# Patient Record
Sex: MALE | Race: WHITE | NOT HISPANIC OR LATINO | URBAN - METROPOLITAN AREA
[De-identification: names, ages, dates, MRNs, and addresses within clinical notes are randomized per-mention and may not be internally consistent; named-entity substitution may affect disease eponyms.]

---

## 2017-08-14 ENCOUNTER — EMERGENCY (EMERGENCY)
Facility: HOSPITAL | Age: 42
LOS: 0 days | Discharge: HOME | End: 2017-08-14
Attending: EMERGENCY MEDICINE
Payer: COMMERCIAL

## 2017-08-14 VITALS
HEART RATE: 76 BPM | RESPIRATION RATE: 16 BRPM | OXYGEN SATURATION: 97 % | TEMPERATURE: 98 F | DIASTOLIC BLOOD PRESSURE: 88 MMHG | SYSTOLIC BLOOD PRESSURE: 140 MMHG

## 2017-08-14 VITALS
DIASTOLIC BLOOD PRESSURE: 94 MMHG | TEMPERATURE: 98 F | SYSTOLIC BLOOD PRESSURE: 144 MMHG | RESPIRATION RATE: 16 BRPM | HEIGHT: 69 IN | OXYGEN SATURATION: 97 % | HEART RATE: 77 BPM | WEIGHT: 220.02 LBS

## 2017-08-14 DIAGNOSIS — M54.5 LOW BACK PAIN: ICD-10-CM

## 2017-08-14 DIAGNOSIS — S39.012A STRAIN OF MUSCLE, FASCIA AND TENDON OF LOWER BACK, INITIAL ENCOUNTER: ICD-10-CM

## 2017-08-14 DIAGNOSIS — J45.909 UNSPECIFIED ASTHMA, UNCOMPLICATED: ICD-10-CM

## 2017-08-14 DIAGNOSIS — K50.90 CROHN'S DISEASE, UNSPECIFIED, WITHOUT COMPLICATIONS: ICD-10-CM

## 2017-08-14 DIAGNOSIS — Z90.49 ACQUIRED ABSENCE OF OTHER SPECIFIED PARTS OF DIGESTIVE TRACT: Chronic | ICD-10-CM

## 2017-08-14 DIAGNOSIS — Z90.49 ACQUIRED ABSENCE OF OTHER SPECIFIED PARTS OF DIGESTIVE TRACT: ICD-10-CM

## 2017-08-14 PROCEDURE — 99283 EMERGENCY DEPT VISIT LOW MDM: CPT

## 2017-08-14 RX ORDER — DIAZEPAM 5 MG
1 TABLET ORAL
Qty: 9 | Refills: 0 | OUTPATIENT
Start: 2017-08-14 | End: 2017-08-17

## 2017-08-14 RX ORDER — TRAMADOL HYDROCHLORIDE 50 MG/1
1 TABLET ORAL
Qty: 12 | Refills: 0 | OUTPATIENT
Start: 2017-08-14 | End: 2017-08-17

## 2017-08-14 RX ORDER — TRAMADOL HYDROCHLORIDE 50 MG/1
50 TABLET ORAL ONCE
Qty: 0 | Refills: 0 | Status: DISCONTINUED | OUTPATIENT
Start: 2017-08-14 | End: 2017-08-14

## 2017-08-14 RX ORDER — ACETAMINOPHEN 500 MG
2 TABLET ORAL
Qty: 40 | Refills: 0 | OUTPATIENT
Start: 2017-08-14 | End: 2017-08-19

## 2017-08-14 RX ORDER — ACETAMINOPHEN 500 MG
975 TABLET ORAL ONCE
Qty: 0 | Refills: 0 | Status: COMPLETED | OUTPATIENT
Start: 2017-08-14 | End: 2017-08-14

## 2017-08-14 RX ORDER — DIAZEPAM 5 MG
5 TABLET ORAL ONCE
Qty: 0 | Refills: 0 | Status: DISCONTINUED | OUTPATIENT
Start: 2017-08-14 | End: 2017-08-14

## 2017-08-14 RX ADMIN — TRAMADOL HYDROCHLORIDE 50 MILLIGRAM(S): 50 TABLET ORAL at 09:49

## 2017-08-14 RX ADMIN — Medication 975 MILLIGRAM(S): at 09:49

## 2017-08-14 RX ADMIN — TRAMADOL HYDROCHLORIDE 50 MILLIGRAM(S): 50 TABLET ORAL at 09:50

## 2017-08-14 RX ADMIN — Medication 5 MILLIGRAM(S): at 09:49

## 2017-08-14 NOTE — ED PROVIDER NOTE - OBJECTIVE STATEMENT
41 year old male with PMH of asthma, Crohn's disease, chronic  back problems presenting to ED due to low back pain  starting 4 days ago, presenting to ED due to worsening, unsure of cause of back pain - but had it mild until Saturday when he lifted some heavy items into truck with father in law. Denies any weakness or focal numbness, symptoms worse with walking or getting up from sitting position.

## 2017-08-14 NOTE — ED PROVIDER NOTE - MEDICAL DECISION MAKING DETAILS
pt back pain improved given valium, tramadol and tylenol pt to follow up with Dr. Barnes as own PMD on vacation.

## 2018-04-05 ENCOUNTER — INPATIENT (INPATIENT)
Facility: HOSPITAL | Age: 43
LOS: 2 days | Discharge: HOME | End: 2018-04-08
Attending: INTERNAL MEDICINE | Admitting: INTERNAL MEDICINE
Payer: MEDICAID

## 2018-04-05 VITALS
SYSTOLIC BLOOD PRESSURE: 116 MMHG | OXYGEN SATURATION: 96 % | WEIGHT: 229.94 LBS | HEIGHT: 70 IN | DIASTOLIC BLOOD PRESSURE: 68 MMHG | RESPIRATION RATE: 20 BRPM | TEMPERATURE: 103 F | HEART RATE: 118 BPM

## 2018-04-05 DIAGNOSIS — Z90.49 ACQUIRED ABSENCE OF OTHER SPECIFIED PARTS OF DIGESTIVE TRACT: Chronic | ICD-10-CM

## 2018-04-05 LAB
ALBUMIN SERPL ELPH-MCNC: 3.3 G/DL — SIGNIFICANT CHANGE UP (ref 3.3–5)
ALP SERPL-CCNC: 99 U/L — SIGNIFICANT CHANGE UP (ref 40–120)
ALT FLD-CCNC: 46 U/L — SIGNIFICANT CHANGE UP (ref 12–78)
ANION GAP SERPL CALC-SCNC: 9 MMOL/L — SIGNIFICANT CHANGE UP (ref 5–17)
ANISOCYTOSIS BLD QL: SLIGHT — SIGNIFICANT CHANGE UP
APPEARANCE UR: CLEAR — SIGNIFICANT CHANGE UP
APTT BLD: 28.7 SEC — SIGNIFICANT CHANGE UP (ref 27.5–37.4)
AST SERPL-CCNC: 33 U/L — SIGNIFICANT CHANGE UP (ref 15–37)
BASOPHILS # BLD AUTO: 0 K/UL — SIGNIFICANT CHANGE UP (ref 0–0.2)
BASOPHILS NFR BLD AUTO: 0 % — SIGNIFICANT CHANGE UP (ref 0–2)
BILIRUB SERPL-MCNC: 0.6 MG/DL — SIGNIFICANT CHANGE UP (ref 0.2–1.2)
BILIRUB UR-MCNC: NEGATIVE — SIGNIFICANT CHANGE UP
BUN SERPL-MCNC: 13 MG/DL — SIGNIFICANT CHANGE UP (ref 7–23)
CALCIUM SERPL-MCNC: 8.2 MG/DL — LOW (ref 8.5–10.1)
CHLORIDE SERPL-SCNC: 105 MMOL/L — SIGNIFICANT CHANGE UP (ref 96–108)
CK MB BLD-MCNC: <0.9 % — SIGNIFICANT CHANGE UP (ref 0–3.5)
CK MB CFR SERPL CALC: <0.5 NG/ML — SIGNIFICANT CHANGE UP (ref 0.5–3.6)
CK SERPL-CCNC: 57 U/L — SIGNIFICANT CHANGE UP (ref 26–308)
CO2 SERPL-SCNC: 27 MMOL/L — SIGNIFICANT CHANGE UP (ref 22–31)
COLOR SPEC: YELLOW — SIGNIFICANT CHANGE UP
CREAT SERPL-MCNC: 1.03 MG/DL — SIGNIFICANT CHANGE UP (ref 0.5–1.3)
DIFF PNL FLD: NEGATIVE — SIGNIFICANT CHANGE UP
EOSINOPHIL # BLD AUTO: 0 K/UL — SIGNIFICANT CHANGE UP (ref 0–0.5)
EOSINOPHIL NFR BLD AUTO: 0 % — SIGNIFICANT CHANGE UP (ref 0–6)
FLUAV SPEC QL CULT: NEGATIVE — SIGNIFICANT CHANGE UP
FLUBV AG SPEC QL IA: NEGATIVE — SIGNIFICANT CHANGE UP
GLUCOSE BLDC GLUCOMTR-MCNC: 103 MG/DL — HIGH (ref 70–99)
GLUCOSE SERPL-MCNC: 99 MG/DL — SIGNIFICANT CHANGE UP (ref 70–99)
GLUCOSE UR QL: NEGATIVE MG/DL — SIGNIFICANT CHANGE UP
HCT VFR BLD CALC: 36.6 % — LOW (ref 39–50)
HGB BLD-MCNC: 12.9 G/DL — LOW (ref 13–17)
INR BLD: 1.13 RATIO — SIGNIFICANT CHANGE UP (ref 0.88–1.16)
KETONES UR-MCNC: NEGATIVE — SIGNIFICANT CHANGE UP
LACTATE SERPL-SCNC: 2.1 MMOL/L — HIGH (ref 0.7–2)
LEUKOCYTE ESTERASE UR-ACNC: NEGATIVE — SIGNIFICANT CHANGE UP
LYMPHOCYTES # BLD AUTO: 0.81 K/UL — LOW (ref 1–3.3)
LYMPHOCYTES # BLD AUTO: 9 % — LOW (ref 13–44)
MANUAL SMEAR VERIFICATION: SIGNIFICANT CHANGE UP
MCHC RBC-ENTMCNC: 28.5 PG — SIGNIFICANT CHANGE UP (ref 27–34)
MCHC RBC-ENTMCNC: 35.2 GM/DL — SIGNIFICANT CHANGE UP (ref 32–36)
MCV RBC AUTO: 81 FL — SIGNIFICANT CHANGE UP (ref 80–100)
MICROCYTES BLD QL: SLIGHT — SIGNIFICANT CHANGE UP
MONOCYTES # BLD AUTO: 0.09 K/UL — SIGNIFICANT CHANGE UP (ref 0–0.9)
MONOCYTES NFR BLD AUTO: 1 % — LOW (ref 2–14)
NEUTROPHILS # BLD AUTO: 8.07 K/UL — HIGH (ref 1.8–7.4)
NEUTROPHILS NFR BLD AUTO: 67 % — SIGNIFICANT CHANGE UP (ref 43–77)
NEUTS BAND # BLD: 23 % — HIGH (ref 0–8)
NITRITE UR-MCNC: NEGATIVE — SIGNIFICANT CHANGE UP
NRBC # BLD: 0 /100 — SIGNIFICANT CHANGE UP (ref 0–0)
NRBC # BLD: SIGNIFICANT CHANGE UP /100 WBCS (ref 0–0)
PH UR: 5 — SIGNIFICANT CHANGE UP (ref 5–8)
PLAT MORPH BLD: NORMAL — SIGNIFICANT CHANGE UP
PLATELET # BLD AUTO: 174 K/UL — SIGNIFICANT CHANGE UP (ref 150–400)
POTASSIUM SERPL-MCNC: 2.8 MMOL/L — CRITICAL LOW (ref 3.5–5.3)
POTASSIUM SERPL-SCNC: 2.8 MMOL/L — CRITICAL LOW (ref 3.5–5.3)
PROT SERPL-MCNC: 7 GM/DL — SIGNIFICANT CHANGE UP (ref 6–8.3)
PROT UR-MCNC: NEGATIVE MG/DL — SIGNIFICANT CHANGE UP
PROTHROM AB SERPL-ACNC: 12.4 SEC — SIGNIFICANT CHANGE UP (ref 9.8–12.7)
RBC # BLD: 4.52 M/UL — SIGNIFICANT CHANGE UP (ref 4.2–5.8)
RBC # FLD: 13.7 % — SIGNIFICANT CHANGE UP (ref 10.3–14.5)
RBC BLD AUTO: SIGNIFICANT CHANGE UP
SODIUM SERPL-SCNC: 141 MMOL/L — SIGNIFICANT CHANGE UP (ref 135–145)
SP GR SPEC: 1 — LOW (ref 1.01–1.02)
TROPONIN I SERPL-MCNC: <.015 NG/ML — SIGNIFICANT CHANGE UP (ref 0.01–0.04)
UROBILINOGEN FLD QL: NEGATIVE MG/DL — SIGNIFICANT CHANGE UP
WBC # BLD: 8.97 K/UL — SIGNIFICANT CHANGE UP (ref 3.8–10.5)
WBC # FLD AUTO: 8.97 K/UL — SIGNIFICANT CHANGE UP (ref 3.8–10.5)

## 2018-04-05 PROCEDURE — 71045 X-RAY EXAM CHEST 1 VIEW: CPT | Mod: 26

## 2018-04-05 PROCEDURE — 99285 EMERGENCY DEPT VISIT HI MDM: CPT

## 2018-04-05 PROCEDURE — 76700 US EXAM ABDOM COMPLETE: CPT | Mod: 26

## 2018-04-05 PROCEDURE — 74176 CT ABD & PELVIS W/O CONTRAST: CPT | Mod: 26

## 2018-04-05 PROCEDURE — 99223 1ST HOSP IP/OBS HIGH 75: CPT

## 2018-04-05 RX ORDER — CYCLOBENZAPRINE HYDROCHLORIDE 10 MG/1
5 TABLET, FILM COATED ORAL
Qty: 0 | Refills: 0 | COMMUNITY

## 2018-04-05 RX ORDER — SODIUM CHLORIDE 9 MG/ML
4000 INJECTION INTRAMUSCULAR; INTRAVENOUS; SUBCUTANEOUS ONCE
Qty: 0 | Refills: 0 | Status: COMPLETED | OUTPATIENT
Start: 2018-04-05 | End: 2018-04-05

## 2018-04-05 RX ORDER — VANCOMYCIN HCL 1 G
1000 VIAL (EA) INTRAVENOUS ONCE
Qty: 0 | Refills: 0 | Status: COMPLETED | OUTPATIENT
Start: 2018-04-05 | End: 2018-04-05

## 2018-04-05 RX ORDER — POTASSIUM CHLORIDE 20 MEQ
10 PACKET (EA) ORAL
Qty: 0 | Refills: 0 | Status: COMPLETED | OUTPATIENT
Start: 2018-04-05 | End: 2018-04-05

## 2018-04-05 RX ORDER — SODIUM CHLORIDE 9 MG/ML
3 INJECTION INTRAMUSCULAR; INTRAVENOUS; SUBCUTANEOUS ONCE
Qty: 0 | Refills: 0 | Status: COMPLETED | OUTPATIENT
Start: 2018-04-05 | End: 2018-04-05

## 2018-04-05 RX ORDER — ACETAMINOPHEN 500 MG
650 TABLET ORAL ONCE
Qty: 0 | Refills: 0 | Status: COMPLETED | OUTPATIENT
Start: 2018-04-05 | End: 2018-04-05

## 2018-04-05 RX ORDER — AZTREONAM 2 G
2000 VIAL (EA) INJECTION ONCE
Qty: 0 | Refills: 0 | Status: COMPLETED | OUTPATIENT
Start: 2018-04-05 | End: 2018-04-05

## 2018-04-05 RX ORDER — POTASSIUM CHLORIDE 20 MEQ
40 PACKET (EA) ORAL ONCE
Qty: 0 | Refills: 0 | Status: COMPLETED | OUTPATIENT
Start: 2018-04-05 | End: 2018-04-05

## 2018-04-05 RX ORDER — ONDANSETRON 8 MG/1
8 TABLET, FILM COATED ORAL ONCE
Qty: 0 | Refills: 0 | Status: COMPLETED | OUTPATIENT
Start: 2018-04-05 | End: 2018-04-05

## 2018-04-05 RX ADMIN — SODIUM CHLORIDE 3 MILLILITER(S): 9 INJECTION INTRAMUSCULAR; INTRAVENOUS; SUBCUTANEOUS at 18:02

## 2018-04-05 RX ADMIN — Medication 100 MILLIEQUIVALENT(S): at 22:15

## 2018-04-05 RX ADMIN — Medication 40 MILLIEQUIVALENT(S): at 21:03

## 2018-04-05 RX ADMIN — Medication 100 MILLIEQUIVALENT(S): at 19:49

## 2018-04-05 RX ADMIN — Medication 650 MILLIGRAM(S): at 18:07

## 2018-04-05 RX ADMIN — Medication 250 MILLIGRAM(S): at 19:49

## 2018-04-05 RX ADMIN — SODIUM CHLORIDE 4000 MILLILITER(S): 9 INJECTION INTRAMUSCULAR; INTRAVENOUS; SUBCUTANEOUS at 18:03

## 2018-04-05 RX ADMIN — Medication 650 MILLIGRAM(S): at 18:44

## 2018-04-05 RX ADMIN — ONDANSETRON 8 MILLIGRAM(S): 8 TABLET, FILM COATED ORAL at 18:28

## 2018-04-05 RX ADMIN — Medication 50 MILLIGRAM(S): at 18:11

## 2018-04-05 RX ADMIN — Medication 100 MILLIEQUIVALENT(S): at 21:03

## 2018-04-05 NOTE — ED PROVIDER NOTE - PHYSICAL EXAMINATION
Gen: Alert, Well appearing. NAD    Head: NC, AT, PERRL, EOMI, normal lids/conjunctiva   ENT: Bilateral TM WNL, normal hearing, patent oropharynx without erythema/exudate, uvula midline. + DRY MM.   Neck: supple, no tenderness/meningismus/JVD   Pulm: Bilateral clear BS, normal resp effort, no wheeze/stridor/retractions  CV: RRR, no M/R/G, +dist pulses   Abd: soft, ++ tender rt abd,  +BS, no guarding/rebound tenderness  Mskel: no edema/erythema/cyanosis   Skin: no rash   Neuro: AAOx3, no sensory/motor deficits, CN 2-12 intact

## 2018-04-05 NOTE — H&P ADULT - NSHPLABSRESULTS_GEN_ALL_CORE
Vital Signs Last 24 Hrs  T(C): 39.5 (2018 17:40), Max: 39.5 (2018 17:40)  T(F): 103.1 (2018 17:40), Max: 103.1 (2018 17:40)  HR: 74 (2018 23:45) (74 - 118)  BP: 101/60 (2018 23:45) (101/60 - 116/68)  BP(mean): --  RR: 20 (2018 17:40) (20 - 20)  SpO2: 96% (2018 17:40) (96% - 96%)        LABS:                        12.9   8.97  )-----------( 174      ( 2018 18:13 )             36.6     04-05    141  |  105  |  13  ----------------------------<  99  2.8<LL>   |  27  |  1.03    Ca    8.2<L>      2018 18:13    TPro  7.0  /  Alb  3.3  /  TBili  0.6  /  DBili  x   /  AST  33  /  ALT  46  /  AlkPhos  99  04-05    PT/INR - ( 2018 18:13 )   PT: 12.4 sec;   INR: 1.13 ratio         PTT - ( 2018 18:13 )  PTT:28.7 sec  Urinalysis Basic - ( 2018 19:34 )    Color: Yellow / Appearance: Clear / S.005 / pH: x  Gluc: x / Ketone: Negative  / Bili: Negative / Urobili: Negative mg/dL   Blood: x / Protein: Negative mg/dL / Nitrite: Negative   Leuk Esterase: Negative / RBC: x / WBC x   Sq Epi: x / Non Sq Epi: x / Bacteria: x        RADIOLOGY & ADDITIONAL STUDIES:    CT ab/plv:  IMPRESSION:  No evidence for colon perforation.  Hepatic steatosis.  1.8 cm gallstone in neck of gallbladder.    US ab:  IMPRESSION:   Fatty liver and hepatosplenomegaly. Cholelithiasis without cholecystitis.

## 2018-04-05 NOTE — ED PROVIDER NOTE - OBJECTIVE STATEMENT
43yo male with pmh asthma, crohns, psh: ileal resection presents with weakness, fever, nausea and near syncope. PT was post routine colonoscopy today. Pt states was in USOH, NPO, and took prep. Pt without issue post colonoscopy however as day progressed, weak, nausea and near syncopal. PT febrile in ER. Pt with mild rt sided abd pain.  gi: [phone], josep ballard, [facility]    ROS: + fever/chills. No photophobia/eye pain/changes in vision, No ear pain/sore throat/dysphagia, No chest pain/palpitations. No SOB/cough/stridor. +abdominal pain, +N, no V/D, no black/bloody bm. No dysuria/frequency/discharge, No headache. + Dizziness.  No rash.  No numbness/tingling/weakness. 41yo male with pmh asthma, crohns, psh: ileal resection presents with weakness, fever, nausea and near syncope. PT was post routine colonoscopy today. Pt states was in USOH, NPO, and took prep. Pt without issue post colonoscopy however as day progressed, weak, nausea and near syncopal. PT febrile in ER. Pt with mild rt sided abd pain.  gi: [phone], josep ballard, [facility] cell: [phone]    ROS: + fever/chills. No photophobia/eye pain/changes in vision, No ear pain/sore throat/dysphagia, No chest pain/palpitations. No SOB/cough/stridor. +abdominal pain, +N, no V/D, no black/bloody bm. No dysuria/frequency/discharge, No headache. + Dizziness.  No rash.  No numbness/tingling/weakness.

## 2018-04-05 NOTE — ED ADULT TRIAGE NOTE - CHIEF COMPLAINT QUOTE
colonoscopy today , came back very weak , as  per father law near syncope outside . f/s -103mg/dl in the ed

## 2018-04-05 NOTE — H&P ADULT - HISTORY OF PRESENT ILLNESS
42 year old man with PMH Asthma (intubated as a child for asthma attack), Crohn's on Stelara, and PSH Ileocecal resection 2/2 Crohn's disease in 2013 at [facility] presents with complaint of fever, chills, mild abdominal pain, and nausea since yesterday.  He states he went for his yearly Colonoscopy yesterday and began to feel sick on the way home.  He denies diarrhea, bloody stool.  He says his abdominal pain has mostly subsided and still feels fever with chills.  Seen by surgery in ED, consult appreciated.    CT ab/plv and US show Cholelithiasis without Cholecystitis.  23% bandemia on labs, lactate 2.1, potassium 2.8.  Given aztreonam and vanco in ED. 42 year old man with PMH Asthma (intubated as a child for asthma attack), Crohn's on Stelara, and PSH Ileocecal resection 2/2 Crohn's disease in 2013 at [facility] presents with complaint of fever, chills, mild abdominal pain, and nausea since earlier today.  He states he went for his yearly Colonoscopy today and began to feel sick on the way home.  He denies diarrhea, bloody stool.  He says his abdominal pain has mostly subsided and still feels fever with chills.  Seen by surgery in ED, consult appreciated.  Of note, he states he had a similar problem last year after ingesting contrast (gadolinium?) for MRI; did not have contrast yesterday, only Colonoscopy prep.    CT ab/plv and US show Cholelithiasis without Cholecystitis.  Pt febrile to 103.1. 23% bandemia on labs, lactate 2.1, potassium 2.8.  Given aztreonam and vanco in ED.

## 2018-04-05 NOTE — CONSULT NOTE ADULT - SUBJECTIVE AND OBJECTIVE BOX
GENERAL SURGERY CONSULT NOTE    Patient is a 42y old  Male who presents with a chief complaint of Fever/chills, weakness.    HPI:  42M with PMH Asthma (intubated as a child for asthma attack), Crohn's, and PSH Ileocecal resection 2/2 Crohn's disease in  at [facility] presents to the ED c/o fever/chills and weakness s/p routine colonoscopy today. Patient was told today that his colonoscopy was normal. When patient got home he developed fever and chills, and began vomiting. Admits to having some right sided periumbilical cramping pain which lasted for a few minutes in the ED but resolved spontaneously. Patient last ate  before he used the prep for colonoscopy. Patient is on immunosuppressive therapy for Crohn's (injection q8wks), and gets yearly colonoscopy. Denies similar incident s/p colonoscopy in the past. Denies hx of biliary colic or known gallbladder disease.   Denies chest pain, palpitations, sob, cough, dizziness, dysuria.       PAST MEDICAL & SURGICAL HISTORY:  Back spasm  Asthma  Crohns disease  History of colon resection      Review of Systems:  I have reviewed 9 systems with the patient and the only positive findings were Fever/chills, N/V, weakness.   Contained within HPI.    Allergies    ibuprofen (Unknown)  iodine (Anaphylaxis)  penicillin (Unknown)  tetracycline (Unknown)    SOCIAL HISTORY   Denies tobacco, alcohol or recreational drug use.   Adopted.    FAMILY HISTORY:  Adopted, unknown family hx.     Vital Signs Last 24 Hrs  T(C): 39.5 (2018 17:40), Max: 39.5 (2018 17:40)  T(F): 103.1 (2018 17:40), Max: 103.1 (2018 17:40)  HR: 118 (2018 17:40) (118 - 118)  BP: 116/68 (2018 17:40) (116/68 - 116/68)  RR: 20 (2018 17:40) (20 - 20)  SpO2: 96% (2018 17:40) (96% - 96%)    Physical Exam:    General:  Appears stated age, well-groomed, well-nourished, no distress  Eyes : DAVID  HENT:  WNL, no JVD  Chest:  clear breath sounds b/l, no wheezing/rhonchi/rales.  Cardiovascular:  S1S2, RRR  Abdomen:  Healed midline surgical scar. +BS, Nondistended, soft, Nontender, no guarding, no rebound.   Extremities:  2+ peripheral pulses b/l. Calf soft, nontender b/l.   Skin: Intact. No rash  Musculoskeletal:  normal strength  Neuro/Psych: A&Ox3. No focal deficits.       LABS:                        12.9   8.97  )-----------( 174      ( 2018 18:13 )             36.6     04-    141  |  105  |  13  ----------------------------<  99  2.8<LL>   |  27  |  1.03    Ca    8.2<L>      2018 18:13    TPro  7.0  /  Alb  3.3  /  TBili  0.6  /  DBili  x   /  AST  33  /  ALT  46  /  AlkPhos  99  04-05    PT/INR - ( 2018 18:13 )   PT: 12.4 sec;   INR: 1.13 ratio         PTT - ( 2018 18:13 )  PTT:28.7 sec  Urinalysis Basic - ( 2018 19:34 )    Color: Yellow / Appearance: Clear / S.005 / pH: x  Gluc: x / Ketone: Negative  / Bili: Negative / Urobili: Negative mg/dL   Blood: x / Protein: Negative mg/dL / Nitrite: Negative   Leuk Esterase: Negative / RBC: x / WBC x   Sq Epi: x / Non Sq Epi: x / Bacteria: x    RADIOLOGY & ADDITIONAL STUDIES:  < from: CT Abdomen and Pelvis No Cont (18 @ 20:18) >  EXAM:  CT ABDOMEN AND PELVIS                            PROCEDURE DATE:  2018          INTERPRETATION:  CLINICAL INFORMATION: Pain and fever following   colonoscopy.    CT of the abdomen and pelvis was performed without intravenous contrast.   Sagittal and coronal reconstructions were obtained.     Comparison: None.    Lower chest: The visualized lower lungs are unremarkable.    Abdomen:   Liver: Hepatic steatosis.  Spleen: normal.  Pancreas: normal.  Adrenal glands: normal.  Gall bladder: 1.8 cm gallstone in neck of gallbladder. The gallbladder is   otherwise unremarkable..  Kidneys: normal.    Lymph nodes: There is no retroperitoneal or abdominal lymphadenopathy.  Aorta and branch vessels: Atherosclerotic changes.  Inferior vena cava: Unremarkable.    Bowel: Anastomotic suture line at the ileocecal interface which is   unremarkable. The colon is otherwise normal. Small bowel is unremarkable.   There is no obstruction.    There is no free air of free fluid.    Pelvis:  Bladder: Normal.  Reproductive organs: Normal.  Lymphnodes: No pathologically enlarged lymphnodes.   Small right fat-containing inguinal hernia    Osseous structures:  The osseous structures show scattered degenerative changes.    IMPRESSION:    No evidence forcolon perforation.    Hepatic steatosis.    1.8 cm gallstone in neck of gallbladder.    < end of copied text >    < from: Xray Chest 1 View AP/PA. (18 @ 18:32) >    EXAM:  XR CHEST AP OR PA 1V                            PROCEDURE DATE:  2018          INTERPRETATION:  CLINICAL INFORMATION: Sepsis.    A single portable view of the chest was obtained.     Comparison: 2011.     The mediastinal cardiac silhouette is unremarkable.    The lungs are clear.     No acute osseous finding.     IMPRESSION:    No acute process.    < end of copied text >

## 2018-04-05 NOTE — ED ADULT NURSE NOTE - OBJECTIVE STATEMENT
patient c/o of abdominal pain with N/V and fever started 2 H ago , patient had an endoscopy and colonoscopy today , patient denied any bleeding , no visible bleeding at this time , patient denied chest pain no difficulty breathing

## 2018-04-05 NOTE — H&P ADULT - ASSESSMENT
42 year old man with PMH Asthma (intubated as a child for asthma attack), Crohn's on Stelara, and PSH Ileocecal resection 2/2 Crohn's disease in 2013 at [facility] presents with complaint of fever, chills, mild abdominal pain, and nausea since earlier today.  Pt meets Sepsis criteria s/p Colonoscopy today and will require admission for at least 2 midnights for further management as detailed below:    IMPROVE VTE Individual Risk Assessment          RISK                                                          Points    [  ] Previous VTE                                                3    [  ] Thrombophilia                                             2    [  ] Lower limb paralysis                                    2        (unable to hold up >15 seconds)      [  ] Current Cancer                                             2         (within 6 months)    [  ] Immobilization > 24 hrs                              1    [  ] ICU/CCU stay > 24 hours                            1    [  ] Age > 60                                                    1    IMPROVE VTE Score ______0___

## 2018-04-05 NOTE — ED PROVIDER NOTE - MEDICAL DECISION MAKING DETAILS
Pending CT, UA and admission. Patient signed out to incoming physician.  All decisions regarding the progression of care will be made at their discretion.

## 2018-04-05 NOTE — H&P ADULT - NSHPPHYSICALEXAM_GEN_ALL_CORE
GENERAL: NAD, well-groomed, well-developed  HEAD:  Atraumatic, Normocephalic  EYES: EOMI, PERRLA, conjunctiva and sclera clear  ENMT: No tonsillar erythema, exudates, or enlargement; Moist mucous membranes, No lesions  NECK: Supple, No JVD, Normal thyroid  NERVOUS SYSTEM:  Alert & Oriented X3, Good concentration DTRs 2+ intact and symmetric  CHEST/LUNG: Clear to ascultation bilaterally; No rales, rhonchi, wheezing, or rubs  HEART: Regular rate and rhythm; No murmurs, rubs, or gallops  ABDOMEN: Soft, mild diffuse tenderness without rebound or guarding, Nondistended; Bowel sounds present  EXTREMITIES: No clubbing, cyanosis, or edema  SKIN: no rashes or lesions   PSYCH: normal affect and behavior

## 2018-04-05 NOTE — H&P ADULT - NSHPREVIEWOFSYSTEMS_GEN_ALL_CORE
No photophobia/eye pain/changes in vision,  No chest pain/palpitations, no SOB/cough/wheeze/stridor, no dysuria/frequency/discharge, No neck/back pain, no rash, no changes in neurological status/function.

## 2018-04-05 NOTE — H&P ADULT - PROBLEM SELECTOR PLAN 1
IVF NS @ 100 ml/hr  Zofran IVPB Q8hrly PRN for nausea/vomiting  Stool work up: Stool Cx, C.diff, Ova parasite and Blood culture x 2 sets  Given Aztreonam and Vanco in ED, would continue as pt s/p instrumentation, de-escalate as appropriate  Tylenol 650mg PO Q8hrly PRN for fever & pain.  GI consult, surgery consult appreciated  Advance diet as tolerated

## 2018-04-05 NOTE — CONSULT NOTE ADULT - ASSESSMENT
Assessment: 42M with PMH Asthma, Crohn's (on immunosuppressive therapy), PSH ileocecal resection presents with Fever/chills, weakness s/p routine colonoscopy today, found with Gallstone on CT scan admitted with sepsis, r/o acute cholecystitis.    Plan:  -Admit to medicine  -F/u Abdominal US to further evaluate GB  -Can likely advance diet as tolerated pending results of abdominal US as patient does not complain of abdominal pain and exam is benign.   -ABX per medicine, IV hydration, antiemetic prn  -DVT prophylaxis, OOB to ambulate  -f/u repeat lactate  -f/u am labs, K repleted by ED, f/u K  -Discussed with Dr. Schuler Assessment: 42M with PMH Asthma, Crohn's (on immunosuppressive therapy), PSH ileocecal resection presents with Fever/chills, weakness s/p routine colonoscopy today, found with Gallstone on CT scan. Admitted with sepsis, r/o acute cholecystitis.    Plan:  -Admit to medicine  -F/u Abdominal US to further evaluate GB  -Can likely advance diet as tolerated pending results of abdominal US as patient does not complain of abdominal pain and exam is benign.   -ABX per medicine, IV hydration, antiemetic prn, antipyretic prn   -DVT prophylaxis, OOB to ambulate  -f/u repeat lactate, f/u blood cultures  -f/u am labs, K repleted by ED, f/u K  -Discussed with Dr. Schuler

## 2018-04-06 DIAGNOSIS — E87.6 HYPOKALEMIA: ICD-10-CM

## 2018-04-06 DIAGNOSIS — Z29.9 ENCOUNTER FOR PROPHYLACTIC MEASURES, UNSPECIFIED: ICD-10-CM

## 2018-04-06 DIAGNOSIS — K50.919 CROHN'S DISEASE, UNSPECIFIED, WITH UNSPECIFIED COMPLICATIONS: ICD-10-CM

## 2018-04-06 DIAGNOSIS — A41.9 SEPSIS, UNSPECIFIED ORGANISM: ICD-10-CM

## 2018-04-06 DIAGNOSIS — K80.20 CALCULUS OF GALLBLADDER WITHOUT CHOLECYSTITIS WITHOUT OBSTRUCTION: ICD-10-CM

## 2018-04-06 DIAGNOSIS — J45.30 MILD PERSISTENT ASTHMA, UNCOMPLICATED: ICD-10-CM

## 2018-04-06 LAB
ALBUMIN SERPL ELPH-MCNC: 2.8 G/DL — LOW (ref 3.3–5)
ALP SERPL-CCNC: 83 U/L — SIGNIFICANT CHANGE UP (ref 40–120)
ALT FLD-CCNC: 48 U/L — SIGNIFICANT CHANGE UP (ref 12–78)
ANION GAP SERPL CALC-SCNC: 9 MMOL/L — SIGNIFICANT CHANGE UP (ref 5–17)
AST SERPL-CCNC: 35 U/L — SIGNIFICANT CHANGE UP (ref 15–37)
BILIRUB SERPL-MCNC: 1.4 MG/DL — HIGH (ref 0.2–1.2)
BUN SERPL-MCNC: 10 MG/DL — SIGNIFICANT CHANGE UP (ref 7–23)
C DIFF BY PCR RESULT: SIGNIFICANT CHANGE UP
C DIFF TOX GENS STL QL NAA+PROBE: SIGNIFICANT CHANGE UP
CALCIUM SERPL-MCNC: 7.2 MG/DL — LOW (ref 8.5–10.1)
CHLORIDE SERPL-SCNC: 109 MMOL/L — HIGH (ref 96–108)
CO2 SERPL-SCNC: 22 MMOL/L — SIGNIFICANT CHANGE UP (ref 22–31)
CREAT SERPL-MCNC: 0.98 MG/DL — SIGNIFICANT CHANGE UP (ref 0.5–1.3)
CRP SERPL-MCNC: 1 MG/DL — HIGH (ref 0–0.4)
CULTURE RESULTS: NO GROWTH — SIGNIFICANT CHANGE UP
GLUCOSE SERPL-MCNC: 105 MG/DL — HIGH (ref 70–99)
HCT VFR BLD CALC: 34.3 % — LOW (ref 39–50)
HGB BLD-MCNC: 11.7 G/DL — LOW (ref 13–17)
LACTATE SERPL-SCNC: 1.3 MMOL/L — SIGNIFICANT CHANGE UP (ref 0.7–2)
LACTATE SERPL-SCNC: 2.1 MMOL/L — HIGH (ref 0.7–2)
MAGNESIUM SERPL-MCNC: 1.3 MG/DL — LOW (ref 1.6–2.6)
MCHC RBC-ENTMCNC: 28.4 PG — SIGNIFICANT CHANGE UP (ref 27–34)
MCHC RBC-ENTMCNC: 34.1 GM/DL — SIGNIFICANT CHANGE UP (ref 32–36)
MCV RBC AUTO: 83.3 FL — SIGNIFICANT CHANGE UP (ref 80–100)
NRBC # BLD: 0 /100 WBCS — SIGNIFICANT CHANGE UP (ref 0–0)
PHOSPHATE SERPL-MCNC: 2.2 MG/DL — LOW (ref 2.5–4.5)
PLATELET # BLD AUTO: 127 K/UL — LOW (ref 150–400)
POTASSIUM SERPL-MCNC: 3.1 MMOL/L — LOW (ref 3.5–5.3)
POTASSIUM SERPL-SCNC: 3.1 MMOL/L — LOW (ref 3.5–5.3)
PROCALCITONIN SERPL-MCNC: 32.06 NG/ML — HIGH (ref 0–0.04)
PROT SERPL-MCNC: 6.4 GM/DL — SIGNIFICANT CHANGE UP (ref 6–8.3)
RBC # BLD: 4.12 M/UL — LOW (ref 4.2–5.8)
RBC # FLD: 14.1 % — SIGNIFICANT CHANGE UP (ref 10.3–14.5)
SODIUM SERPL-SCNC: 140 MMOL/L — SIGNIFICANT CHANGE UP (ref 135–145)
SPECIMEN SOURCE: SIGNIFICANT CHANGE UP
WBC # BLD: 8.8 K/UL — SIGNIFICANT CHANGE UP (ref 3.8–10.5)
WBC # FLD AUTO: 8.8 K/UL — SIGNIFICANT CHANGE UP (ref 3.8–10.5)

## 2018-04-06 PROCEDURE — 74018 RADEX ABDOMEN 1 VIEW: CPT | Mod: 26

## 2018-04-06 PROCEDURE — 99233 SBSQ HOSP IP/OBS HIGH 50: CPT

## 2018-04-06 PROCEDURE — 93010 ELECTROCARDIOGRAM REPORT: CPT

## 2018-04-06 RX ORDER — POTASSIUM PHOSPHATE, MONOBASIC POTASSIUM PHOSPHATE, DIBASIC 236; 224 MG/ML; MG/ML
15 INJECTION, SOLUTION INTRAVENOUS EVERY 6 HOURS
Qty: 0 | Refills: 0 | Status: COMPLETED | OUTPATIENT
Start: 2018-04-06 | End: 2018-04-06

## 2018-04-06 RX ORDER — ACETAMINOPHEN 500 MG
650 TABLET ORAL EVERY 6 HOURS
Qty: 0 | Refills: 0 | Status: DISCONTINUED | OUTPATIENT
Start: 2018-04-06 | End: 2018-04-08

## 2018-04-06 RX ORDER — ALBUTEROL 90 UG/1
2 AEROSOL, METERED ORAL EVERY 6 HOURS
Qty: 0 | Refills: 0 | Status: DISCONTINUED | OUTPATIENT
Start: 2018-04-06 | End: 2018-04-08

## 2018-04-06 RX ORDER — POTASSIUM CHLORIDE 20 MEQ
40 PACKET (EA) ORAL ONCE
Qty: 0 | Refills: 0 | Status: COMPLETED | OUTPATIENT
Start: 2018-04-06 | End: 2018-04-06

## 2018-04-06 RX ORDER — SODIUM CHLORIDE 9 MG/ML
1000 INJECTION INTRAMUSCULAR; INTRAVENOUS; SUBCUTANEOUS
Qty: 0 | Refills: 0 | Status: DISCONTINUED | OUTPATIENT
Start: 2018-04-06 | End: 2018-04-08

## 2018-04-06 RX ORDER — SODIUM,POTASSIUM PHOSPHATES 278-250MG
1 POWDER IN PACKET (EA) ORAL
Qty: 0 | Refills: 0 | Status: DISCONTINUED | OUTPATIENT
Start: 2018-04-06 | End: 2018-04-06

## 2018-04-06 RX ORDER — BUDESONIDE AND FORMOTEROL FUMARATE DIHYDRATE 160; 4.5 UG/1; UG/1
2 AEROSOL RESPIRATORY (INHALATION)
Qty: 0 | Refills: 0 | Status: DISCONTINUED | OUTPATIENT
Start: 2018-04-06 | End: 2018-04-08

## 2018-04-06 RX ORDER — MAGNESIUM SULFATE 500 MG/ML
1 VIAL (ML) INJECTION
Qty: 0 | Refills: 0 | Status: COMPLETED | OUTPATIENT
Start: 2018-04-06 | End: 2018-04-06

## 2018-04-06 RX ORDER — ALPRAZOLAM 0.25 MG
0.5 TABLET ORAL ONCE
Qty: 0 | Refills: 0 | Status: DISCONTINUED | OUTPATIENT
Start: 2018-04-06 | End: 2018-04-06

## 2018-04-06 RX ORDER — AZTREONAM 2 G
1000 VIAL (EA) INJECTION EVERY 12 HOURS
Qty: 0 | Refills: 0 | Status: DISCONTINUED | OUTPATIENT
Start: 2018-04-06 | End: 2018-04-08

## 2018-04-06 RX ORDER — VANCOMYCIN HCL 1 G
1000 VIAL (EA) INTRAVENOUS EVERY 8 HOURS
Qty: 0 | Refills: 0 | Status: DISCONTINUED | OUTPATIENT
Start: 2018-04-06 | End: 2018-04-08

## 2018-04-06 RX ORDER — ONDANSETRON 8 MG/1
8 TABLET, FILM COATED ORAL EVERY 8 HOURS
Qty: 0 | Refills: 0 | Status: DISCONTINUED | OUTPATIENT
Start: 2018-04-06 | End: 2018-04-08

## 2018-04-06 RX ADMIN — Medication 650 MILLIGRAM(S): at 14:54

## 2018-04-06 RX ADMIN — Medication 0.5 MILLIGRAM(S): at 19:55

## 2018-04-06 RX ADMIN — Medication 1 TABLET(S): at 13:03

## 2018-04-06 RX ADMIN — ONDANSETRON 8 MILLIGRAM(S): 8 TABLET, FILM COATED ORAL at 14:53

## 2018-04-06 RX ADMIN — Medication 250 MILLIGRAM(S): at 05:46

## 2018-04-06 RX ADMIN — Medication 100 GRAM(S): at 11:37

## 2018-04-06 RX ADMIN — Medication 650 MILLIGRAM(S): at 19:55

## 2018-04-06 RX ADMIN — SODIUM CHLORIDE 100 MILLILITER(S): 9 INJECTION INTRAMUSCULAR; INTRAVENOUS; SUBCUTANEOUS at 02:56

## 2018-04-06 RX ADMIN — Medication 250 MILLIGRAM(S): at 22:14

## 2018-04-06 RX ADMIN — Medication 50 MILLIGRAM(S): at 05:45

## 2018-04-06 RX ADMIN — SODIUM CHLORIDE 100 MILLILITER(S): 9 INJECTION INTRAMUSCULAR; INTRAVENOUS; SUBCUTANEOUS at 13:50

## 2018-04-06 RX ADMIN — POTASSIUM PHOSPHATE, MONOBASIC POTASSIUM PHOSPHATE, DIBASIC 63.75 MILLIMOLE(S): 236; 224 INJECTION, SOLUTION INTRAVENOUS at 23:24

## 2018-04-06 RX ADMIN — Medication 650 MILLIGRAM(S): at 08:06

## 2018-04-06 RX ADMIN — ONDANSETRON 8 MILLIGRAM(S): 8 TABLET, FILM COATED ORAL at 02:41

## 2018-04-06 RX ADMIN — Medication 50 MILLIGRAM(S): at 17:51

## 2018-04-06 RX ADMIN — POTASSIUM PHOSPHATE, MONOBASIC POTASSIUM PHOSPHATE, DIBASIC 63.75 MILLIMOLE(S): 236; 224 INJECTION, SOLUTION INTRAVENOUS at 17:46

## 2018-04-06 RX ADMIN — BUDESONIDE AND FORMOTEROL FUMARATE DIHYDRATE 2 PUFF(S): 160; 4.5 AEROSOL RESPIRATORY (INHALATION) at 05:45

## 2018-04-06 RX ADMIN — BUDESONIDE AND FORMOTEROL FUMARATE DIHYDRATE 2 PUFF(S): 160; 4.5 AEROSOL RESPIRATORY (INHALATION) at 18:45

## 2018-04-06 RX ADMIN — Medication 100 GRAM(S): at 10:29

## 2018-04-06 RX ADMIN — Medication 650 MILLIGRAM(S): at 02:55

## 2018-04-06 RX ADMIN — Medication 250 MILLIGRAM(S): at 13:50

## 2018-04-06 RX ADMIN — Medication 40 MILLIEQUIVALENT(S): at 10:25

## 2018-04-06 NOTE — PROGRESS NOTE ADULT - SUBJECTIVE AND OBJECTIVE BOX
Patient is a 42y old  Male who presents with a chief complaint of Sepsis after Colonoscopy (2018 23:59)  had colonoscopy yesterday in [place]. after reaching home developed fever of 103. . patient has hx of resection of ileum for Croh's disease , asthma.   Pt evaluated by Gi Dr Isabel.  INTERVAL HPI/OVERNIGHT EVENTS: uneventful. denies any acute abd pain. . no fever now.    MEDICATIONS  (STANDING):  aztreonam  IVPB 1000 milliGRAM(s) IV Intermittent every 12 hours  buDESOnide 160 MICROgram(s)/formoterol 4.5 MICROgram(s) Inhaler 2 Puff(s) Inhalation two times a day  magnesium sulfate  IVPB 1 Gram(s) IV Intermittent every 1 hour  potassium acid phosphate/sodium acid phosphate tablet (K-PHOS No. 2) 1 Tablet(s) Oral four times a day with meals  sodium chloride 0.9%. 1000 milliLiter(s) (100 mL/Hr) IV Continuous <Continuous>  vancomycin  IVPB 1000 milliGRAM(s) IV Intermittent every 8 hours    MEDICATIONS  (PRN):  acetaminophen   Tablet 650 milliGRAM(s) Oral every 6 hours PRN For Temp greater than 38 C (100.4 F)  ALBUTerol    90 MICROgram(s) HFA Inhaler 2 Puff(s) Inhalation every 6 hours PRN Shortness of Breath and/or Wheezing  ondansetron Injectable 8 milliGRAM(s) IV Push every 8 hours PRN Nausea and/or Vomiting      Allergies    ibuprofen (Unknown)  iodine (Anaphylaxis)  penicillin (Unknown)  tetracycline (Unknown)    Intolerances        REVIEW OF SYSTEMS:  CONSTITUTIONAL: No fever, weight loss, or fatigue  EYES: No eye pain, visual disturbances, or discharge  ENMT:  No difficulty hearing, tinnitus, vertigo; No sinus or throat pain  NECK: No pain or stiffness  BREASTS: No pain, masses, or nipple discharge  RESPIRATORY: No cough, wheezing, chills or hemoptysis; No shortness of breath  CARDIOVASCULAR: No chest pain, palpitations, dizziness, or leg swelling  GASTROINTESTINAL: No abdominal or epigastric pain. No nausea, vomiting, or hematemesis; No diarrhea or constipation. No melena or hematochezia.  GENITOURINARY: No dysuria, frequency, hematuria, or incontinence  NEUROLOGICAL: No headaches, memory loss, loss of strength, numbness, or tremors  SKIN: No itching, burning, rashes, or lesions   LYMPH NODES: No enlarged glands  ENDOCRINE: No heat or cold intolerance; No hair loss  MUSCULOSKELETAL: No joint pain or swelling; No muscle, back, or extremity pain  PSYCHIATRIC: No depression, anxiety, mood swings, or difficulty sleeping  HEME/LYMPH: No easy bruising, or bleeding gums  ALLERY AND IMMUNOLOGIC: No hives or eczema    Vital Signs Last 24 Hrs  T(C): 37.7 (2018 04:05), Max: 39.5 (2018 17:40)  T(F): 99.8 (2018 04:05), Max: 103.1 (2018 17:40)  HR: 94 (2018 04:05) (68 - 118)  BP: 121/62 (2018 04:05) (101/60 - 121/62)  BP(mean): --  RR: 17 (2018 04:05) (16 - 20)  SpO2: 95% (2018 04:05) (95% - 99%)    PHYSICAL EXAM:  GENERAL: NAD, well-groomed, well-developed  HEAD:  Atraumatic, Normocephalic  EYES: EOMI, PERRLA, conjunctiva and sclera clear  ENMT: No tonsillar erythema, exudates, or enlargement; Moist mucous membranes, Good dentition, No lesions  NECK: Supple, No JVD, Normal thyroid  NERVOUS SYSTEM:  Alert & Oriented X3, Good concentration; Motor Strength 5/5 B/L upper and lower extremities; DTRs 2+ intact and symmetric  CHEST/LUNG: Clear to percussion bilaterally; No rales, rhonchi, wheezing, or rubs  HEART: Regular rate and rhythm; No murmurs, rubs, or gallops  ABDOMEN: Soft, Nontender, Nondistended; Bowel sounds present. No guarding.  EXTREMITIES:  2+ Peripheral Pulses, No clubbing, cyanosis, or edema  LYMPH: No lymphadenopathy noted  SKIN: No rashes or lesions    LABS:                        11.7   8.80  )-----------( 127      ( 2018 08:15 )             34.3     04-06    140  |  109<H>  |  10  ----------------------------<  105<H>  3.1<L>   |  22  |  0.98    Ca    7.2<L>      2018 08:15  Phos  2.2     04-06  Mg     1.3     04-06    TPro  6.4  /  Alb  2.8<L>  /  TBili  1.4<H>  /  DBili  x   /  AST  35  /  ALT  48  /  AlkPhos  83  04-06      PT/INR - ( 2018 18:13 )   PT: 12.4 sec;   INR: 1.13 ratio         PTT - ( 2018 18:13 )  PTT:28.7 sec  Urinalysis Basic - ( 2018 19:34 )    Color: Yellow / Appearance: Clear / S.005 / pH: x  Gluc: x / Ketone: Negative  / Bili: Negative / Urobili: Negative mg/dL   Blood: x / Protein: Negative mg/dL / Nitrite: Negative   Leuk Esterase: Negative / RBC: x / WBC x   Sq Epi: x / Non Sq Epi: x / Bacteria: x      CAPILLARY BLOOD GLUCOSE      POCT Blood Glucose.: 103 mg/dL (2018 17:39)      CARDIAC MARKERS ( 2018 18:13 )  <.015 ng/mL / x     / 57 U/L / x     / <0.5 ng/mL            RADIOLOGY & ADDITIONAL TESTS:    Imaging Personally Reviewed:  [ ] YES  [ ] NO    Consultant(s) Notes Reviewed:  [ ] YES  [ ] NO    Care Discussed with Consultants/Other Providers [ ] YES  [ ] NO    Care discussed with family,         [  ]   yes  [  ]  No    imp:    stable[ ]    unstable[  ]     improving [   ]       unchanged  [x  ]                Plans:  Continue present plans  [ x ] continue Aztreonam. GI follow up. stool sent for c. dif.               New consult [  ]   specialty  .......               order test[  ]    test name.                  Discharge Planning  [  ]

## 2018-04-06 NOTE — CONSULT NOTE ADULT - SUBJECTIVE AND OBJECTIVE BOX
Chief Complaint:  Patient is a 42y old  Male who presents with a chief complaint of Sepsis after Colonoscopy (2018 23:59)      HPI:  42 year old man with PMH Asthma (intubated as a child for asthma attack), Crohn's on Stelara, and PSH Ileocecal resection 2/2 Crohn's disease in  at [facility] presents with complaint of fever, chills, mild abdominal pain, and nausea since earlier today.  He states he went for his yearly Colonoscopy (DR AUGUSTA FERRO)today and began to feel sick on the way home.  He denies diarrhea, bloody stool.  He says his abdominal pain has mostly subsided and still feels fever with chills.  Seen by surgery in ED, consult appreciated.  Of note, he states he had a similar problem last year after ingesting contrast (gadolinium?) for MRI; did not have contrast yesterday, only Colonoscopy prep.    CT ab/plv and US show Cholelithiasis without Cholecystitis.  Pt febrile to 103.1. 23% bandemia on labs, lactate 2.1, potassium 2.8.  Given aztreonam and vanco in ED. (2018 23:59)      PMH/PSH:PAST MEDICAL & SURGICAL HISTORY:  Back spasm  Asthma  Crohns disease  History of colon resection      Allergies:  ibuprofen (Unknown)  iodine (Anaphylaxis)  penicillin (Unknown)  tetracycline (Unknown)      Medications:  acetaminophen   Tablet 650 milliGRAM(s) Oral every 6 hours PRN  ALBUTerol    90 MICROgram(s) HFA Inhaler 2 Puff(s) Inhalation every 6 hours PRN  aztreonam  IVPB 1000 milliGRAM(s) IV Intermittent every 12 hours  buDESOnide 160 MICROgram(s)/formoterol 4.5 MICROgram(s) Inhaler 2 Puff(s) Inhalation two times a day  ondansetron Injectable 8 milliGRAM(s) IV Push every 8 hours PRN  sodium chloride 0.9%. 1000 milliLiter(s) IV Continuous <Continuous>  vancomycin  IVPB 1000 milliGRAM(s) IV Intermittent every 8 hours      Review of Systems:    General:  No weight loss, +fevers, chills, night sweats, fatigue,   Eyes:  Good vision, no reported pain  ENT:  No sore throat, pain, runny nose, dysphagia  CV:  No pain, palpitations, hypo/hypertension  Resp:  No dyspnea, cough, tachypnea, wheezing  GI:  +pain, No nausea, No vomiting, No diarrhea, No constipation, No pruritis, No rectal bleeding, No tarry stools, No dysphagia,  :  No pain, bleeding, incontinence, nocturia  Muscle:  No pain, weakness  Breast:  No pain, abscess, mass, discharge  Neuro:  No weakness, tingling, memory problems  Psych:  No fatigue, insomnia, mood problems, depression  Endocrine:  No polyuria, polydipsia, cold/heat intolerance  Heme:  No petechiae, ecchymosis, easy bruisability  Skin:  No rash, tattoos, scars, edema    Relevant Family History:   FAMILY HISTORY:  No pertinent family history in first degree relatives      Relevant Social History: Alcohol (- ) , Tobacco ( -) , Illicit drugs ( -)     Physical Exam:    Vital Signs:  Vital Signs Last 24 Hrs  T(C): 37.7 (2018 04:05), Max: 39.5 (2018 17:40)  T(F): 99.8 (2018 04:05), Max: 103.1 (2018 17:40)  HR: 94 (2018 04:05) (68 - 118)  BP: 121/62 (2018 04:05) (101/60 - 121/62)  BP(mean): --  RR: 17 (2018 04:05) (16 - 20)  SpO2: 95% (2018 04:05) (95% - 99%)  Daily Height in cm: 177.8 (2018 04:05)    Daily     General:  Appears stated age, well-groomed, well-nourished, no distress  HEENT:  NC/AT,  conjunctivae clear and pink, no thyromegaly, nodules, adenopathy, no JVD  Chest:  Full & symmetric excursion, no increased effort, breath sounds clear  Cardiovascular:  Regular rhythm, S1, S2, no murmur/rub/S3/S4, no abdominal bruit, no edema  Abdomen:  Soft, MILD -tender, well healed scar non-distended, normoactive bowel sounds,  no masses , no hepatosplenomegaly, no signs of chronic liver disease  Extremities:  no cyanosis, clubbing or edema  Skin:  No rash/erythema/ecchymoses/petechiae/wounds/abscess/warm/dry  Neuro/Psych:  Alert, oriented, no asterixis, no tremor, no encephalopathy    Laboratory:                          12.9   8.97  )-----------( 174      ( 2018 18:13 )             36.6         141  |  105  |  13  ----------------------------<  99  2.8<LL>   |  27  |  1.03    Ca    8.2<L>      2018 18:13    TPro  7.0  /  Alb  3.3  /  TBili  0.6  /  DBili  x   /  AST  33  /  ALT  46  /  AlkPhos  99      LIVER FUNCTIONS - ( 2018 18:13 )  Alb: 3.3 g/dL / Pro: 7.0 gm/dL / ALK PHOS: 99 U/L / ALT: 46 U/L / AST: 33 U/L / GGT: x           PT/INR - ( 2018 18:13 )   PT: 12.4 sec;   INR: 1.13 ratio         PTT - ( 2018 18:13 )  PTT:28.7 sec  Urinalysis Basic - ( 2018 19:34 )    Color: Yellow / Appearance: Clear / S.005 / pH: x  Gluc: x / Ketone: Negative  / Bili: Negative / Urobili: Negative mg/dL   Blood: x / Protein: Negative mg/dL / Nitrite: Negative   Leuk Esterase: Negative / RBC: x / WBC x   Sq Epi: x / Non Sq Epi: x / Bacteria: x          Intake and Output    18 @ 07:01  -  18 @ 07:00  --------------------------------------------------------  IN: 300 mL / OUT: 0 mL / NET: 300 mL        Imaging:  < from: CT Abdomen and Pelvis No Cont (18 @ 20:18) >  EXAM:  CT ABDOMEN AND PELVIS                            PROCEDURE DATE:  2018          INTERPRETATION:  CLINICAL INFORMATION: Pain and fever following   colonoscopy.    CT of the abdomen and pelvis was performed without intravenous contrast.   Sagittal and coronal reconstructions were obtained.     Comparison: None.    Lower chest: The visualized lower lungs are unremarkable.    Abdomen:   Liver: Hepatic steatosis.  Spleen: normal.  Pancreas: normal.  Adrenal glands: normal.  Gall bladder: 1.8 cm gallstone in neck of gallbladder. The gallbladder is   otherwise unremarkable..  Kidneys: normal.    Lymph nodes: There is no retroperitoneal or abdominal lymphadenopathy.  Aorta and branch vessels: Atherosclerotic changes.  Inferior vena cava: Unremarkable.    Bowel: Anastomotic suture line at the ileocecal interface which is   unremarkable. The colon is otherwise normal. Small bowel is unremarkable.   There is no obstruction.    There is no free air of free fluid.    Pelvis:  Bladder: Normal.  Reproductive organs: Normal.  Lymphnodes: No pathologically enlarged lymphnodes.   Small right fat-containing inguinal hernia    Osseous structures:  The osseous structures show scattered degenerative changes.    IMPRESSION:    No evidence for colon perforation.    Hepatic steatosis.    1.8 cm gallstone in neck of gallbladder.    ZEN SANDOVAL M.D., ATTENDING RADIOLOGIST  This document has been electronically signed. 2018  8:33PM

## 2018-04-06 NOTE — PROGRESS NOTE ADULT - SUBJECTIVE AND OBJECTIVE BOX
INTERVAL HPI/OVERNIGHT EVENTS:  Pt. seen and examined at bedside resting comfortably. Denies abdominal pain, nausea or vomiting. +flatus/+BM.  No acute overnight event.     Vital Signs Last 24 Hrs  T(C): 37.7 (2018 04:05), Max: 39.5 (2018 17:40)  T(F): 99.8 (2018 04:05), Max: 103.1 (2018 17:40)  HR: 94 (2018 04:05) (68 - 118)  BP: 121/62 (2018 04:05) (101/60 - 121/62)  RR: 17 (2018 04:05) (16 - 20)  SpO2: 95% (2018 04:05) (95% - 99%)    PHYSICAL EXAM:    GENERAL: NAD  HEAD:  Atraumatic, Normocephalic  EYES: EOMI, PERRLA, conjunctiva and sclera clear  CHEST/LUNG: Clear to ausculation, bilaterally   HEART: S1S2, RRR  ABDOMEN: Obese, non distended, +BS, soft, non tender, no guarding  EXTREMITIES:  calf soft, non tender b/l. 2+ distal pulses b/l.     I&O's Detail    2018 07:01  -  2018 06:42  --------------------------------------------------------  IN:    Solution: 50 mL    Solution: 250 mL  Total IN: 300 mL    OUT:  Total OUT: 0 mL    Total NET: 300 mL    LABS:                        12.9   8.97  )-----------( 174      ( 2018 18:13 )             36.6     04-05    141  |  105  |  13  ----------------------------<  99  2.8<LL>   |  27  |  1.03    Ca    8.2<L>      2018 18:13    TPro  7.0  /  Alb  3.3  /  TBili  0.6  /  DBili  x   /  AST  33  /  ALT  46  /  AlkPhos  99  04-05    PT/INR - ( 2018 18:13 )   PT: 12.4 sec;   INR: 1.13 ratio         PTT - ( 2018 18:13 )  PTT:28.7 sec  Urinalysis Basic - ( 2018 19:34 )    Color: Yellow / Appearance: Clear / S.005 / pH: x  Gluc: x / Ketone: Negative  / Bili: Negative / Urobili: Negative mg/dL   Blood: x / Protein: Negative mg/dL / Nitrite: Negative   Leuk Esterase: Negative / RBC: x / WBC x   Sq Epi: x / Non Sq Epi: x / Bacteria: x    RADIOLOGY & ADDITIONAL STUDIES:  < from: US Abdomen Complete (18 @ 22:57) >    EXAM:  US ABDOMINAL COMPLETE                            PROCEDURE DATE:  2018          INTERPRETATION:  CLINICAL INFORMATION: Gallstone in the gallbladder neck   on recent CT. Abdominal pain and fever.    COMPARISON: CT abdomen and pelvis 2018.    TECHNIQUE: Sonography of the abdomen.     FINDINGS:    Liver: Fatty liver and hepatomegaly.    Bile ducts: Normal caliber. Common bile duct measures 5 mm.     Gallbladder: Gallstone in the gallbladder without wall thickening,   pericholecystic fluid or sonographic Brown sign.        Pancreas: Visualized portions are within normal limits.    Spleen: Enlarged, measuring 13.6 cm.        Right kidney: 11.7 cm. No hydronephrosis.        Left kidney: 11.9 cm.  No hydronephrosis.        Ascites: None.    Aorta and IVC: Visualized portions are within normal limits.    IMPRESSION:     Fatty liver and hepatosplenomegaly. Cholelithiasis without cholecystitis.    < end of copied text >    Assessment: 42M with PMH Asthma, Crohn's (on immunosuppressive therapy), PSH ileocecal resection presents with Fever/chills, weakness s/p routine colonoscopy today, admitted with sepsis.   Patient found with Cholelithiasis on CT/Abd US without signs of acute cholecystitis.    Plan:  -Advance diet as tolerated  -ABX per medicine, IV hydration, antiemetic prn, antipyretic prn   -DVT prophylaxis, OOB to ambulate  -f/u repeat lactate, f/u blood cultures  -f/u am labs, K repleted by ED, f/u K  -Imaging with incidental finding of Gallstone and patient s/s not consistent with acute elaine, there is low suspicion that source of sepsis is coming from the Gallbladder. Continue sepsis workup and medical management by medicine team.   -No acute surgical intervention  -Discussed with Dr. Schuler INTERVAL HPI/OVERNIGHT EVENTS:  Pt. seen and examined at bedside resting comfortably. Denies abdominal pain, nausea or vomiting. +flatus.   No new overnight event.     Vital Signs Last 24 Hrs  T(C): 37.7 (2018 04:05), Max: 39.5 (2018 17:40)  T(F): 99.8 (2018 04:05), Max: 103.1 (2018 17:40)  HR: 94 (2018 04:05) (68 - 118)  BP: 121/62 (2018 04:05) (101/60 - 121/62)  RR: 17 (2018 04:05) (16 - 20)  SpO2: 95% (2018 04:05) (95% - 99%)    PHYSICAL EXAM:    GENERAL: NAD  HEAD:  Atraumatic, Normocephalic  EYES: EOMI, PERRLA, conjunctiva and sclera clear  CHEST/LUNG: Clear to ausculation, bilaterally   HEART: S1S2, RRR  ABDOMEN: healed midline surgical scar. Obese, non distended, +BS, soft, non tender, no guarding  EXTREMITIES:  calf soft, non tender b/l. 2+ distal pulses b/l.     I&O's Detail    2018 07:01  -  2018 06:42  --------------------------------------------------------  IN:    Solution: 50 mL    Solution: 250 mL  Total IN: 300 mL    OUT:  Total OUT: 0 mL    Total NET: 300 mL    LABS:                        12.9   8.97  )-----------( 174      ( 2018 18:13 )             36.6     04-05    141  |  105  |  13  ----------------------------<  99  2.8<LL>   |  27  |  1.03    Ca    8.2<L>      2018 18:13    TPro  7.0  /  Alb  3.3  /  TBili  0.6  /  DBili  x   /  AST  33  /  ALT  46  /  AlkPhos  99  04-05    PT/INR - ( 2018 18:13 )   PT: 12.4 sec;   INR: 1.13 ratio         PTT - ( 2018 18:13 )  PTT:28.7 sec  Urinalysis Basic - ( 2018 19:34 )    Color: Yellow / Appearance: Clear / S.005 / pH: x  Gluc: x / Ketone: Negative  / Bili: Negative / Urobili: Negative mg/dL   Blood: x / Protein: Negative mg/dL / Nitrite: Negative   Leuk Esterase: Negative / RBC: x / WBC x   Sq Epi: x / Non Sq Epi: x / Bacteria: x    RADIOLOGY & ADDITIONAL STUDIES:  < from: US Abdomen Complete (18 @ 22:57) >    EXAM:  US ABDOMINAL COMPLETE                            PROCEDURE DATE:  2018          INTERPRETATION:  CLINICAL INFORMATION: Gallstone in the gallbladder neck   on recent CT. Abdominal pain and fever.    COMPARISON: CT abdomen and pelvis 2018.    TECHNIQUE: Sonography of the abdomen.     FINDINGS:    Liver: Fatty liver and hepatomegaly.    Bile ducts: Normal caliber. Common bile duct measures 5 mm.     Gallbladder: Gallstone in the gallbladder without wall thickening,   pericholecystic fluid or sonographic Brown sign.        Pancreas: Visualized portions are within normal limits.    Spleen: Enlarged, measuring 13.6 cm.        Right kidney: 11.7 cm. No hydronephrosis.        Left kidney: 11.9 cm.  No hydronephrosis.        Ascites: None.    Aorta and IVC: Visualized portions are within normal limits.    IMPRESSION:     Fatty liver and hepatosplenomegaly. Cholelithiasis without cholecystitis.    < end of copied text >    Assessment: 42M with PMH Asthma, Crohn's (on immunosuppressive therapy), PSH ileocecal resection presents with Fever/chills, weakness s/p routine colonoscopy today, admitted with sepsis.   Patient found with Cholelithiasis on CT/Abd US without signs of acute cholecystitis.    Plan:  -Advance diet as tolerated  -ABX per medicine, IV hydration, antiemetic prn, antipyretic prn   -DVT prophylaxis, OOB to ambulate  -f/u labs, cultures  -Imaging with incidental finding of Gallstone and patient s/s not consistent with acute elaine, there is low suspicion that source of sepsis is coming from the Gallbladder. Continue sepsis workup and medical management by medicine team.   -No acute surgical intervention  -Discussed with Dr. Schuler

## 2018-04-07 DIAGNOSIS — K50.019 CROHN'S DISEASE OF SMALL INTESTINE WITH UNSPECIFIED COMPLICATIONS: ICD-10-CM

## 2018-04-07 LAB
ALBUMIN SERPL ELPH-MCNC: 2.8 G/DL — LOW (ref 3.3–5)
ALP SERPL-CCNC: 78 U/L — SIGNIFICANT CHANGE UP (ref 40–120)
ALT FLD-CCNC: 49 U/L — SIGNIFICANT CHANGE UP (ref 12–78)
ANION GAP SERPL CALC-SCNC: 6 MMOL/L — SIGNIFICANT CHANGE UP (ref 5–17)
ANION GAP SERPL CALC-SCNC: 8 MMOL/L — SIGNIFICANT CHANGE UP (ref 5–17)
AST SERPL-CCNC: 40 U/L — HIGH (ref 15–37)
BASOPHILS # BLD AUTO: 0.02 K/UL — SIGNIFICANT CHANGE UP (ref 0–0.2)
BASOPHILS NFR BLD AUTO: 0.4 % — SIGNIFICANT CHANGE UP (ref 0–2)
BILIRUB DIRECT SERPL-MCNC: 0.2 MG/DL — SIGNIFICANT CHANGE UP (ref 0.05–0.2)
BILIRUB INDIRECT FLD-MCNC: 0.6 MG/DL — SIGNIFICANT CHANGE UP (ref 0.2–1)
BILIRUB SERPL-MCNC: 0.8 MG/DL — SIGNIFICANT CHANGE UP (ref 0.2–1.2)
BUN SERPL-MCNC: 6 MG/DL — LOW (ref 7–23)
BUN SERPL-MCNC: 6 MG/DL — LOW (ref 7–23)
CALCIUM SERPL-MCNC: 7.7 MG/DL — LOW (ref 8.5–10.1)
CALCIUM SERPL-MCNC: 8.1 MG/DL — LOW (ref 8.5–10.1)
CHLORIDE SERPL-SCNC: 108 MMOL/L — SIGNIFICANT CHANGE UP (ref 96–108)
CHLORIDE SERPL-SCNC: 109 MMOL/L — HIGH (ref 96–108)
CO2 SERPL-SCNC: 26 MMOL/L — SIGNIFICANT CHANGE UP (ref 22–31)
CO2 SERPL-SCNC: 27 MMOL/L — SIGNIFICANT CHANGE UP (ref 22–31)
CREAT SERPL-MCNC: 0.75 MG/DL — SIGNIFICANT CHANGE UP (ref 0.5–1.3)
CREAT SERPL-MCNC: 0.84 MG/DL — SIGNIFICANT CHANGE UP (ref 0.5–1.3)
EOSINOPHIL # BLD AUTO: 0.05 K/UL — SIGNIFICANT CHANGE UP (ref 0–0.5)
EOSINOPHIL NFR BLD AUTO: 1.1 % — SIGNIFICANT CHANGE UP (ref 0–6)
GLUCOSE SERPL-MCNC: 108 MG/DL — HIGH (ref 70–99)
GLUCOSE SERPL-MCNC: 118 MG/DL — HIGH (ref 70–99)
HCT VFR BLD CALC: 33.4 % — LOW (ref 39–50)
HGB BLD-MCNC: 11.3 G/DL — LOW (ref 13–17)
IMM GRANULOCYTES NFR BLD AUTO: 0.6 % — SIGNIFICANT CHANGE UP (ref 0–1.5)
LYMPHOCYTES # BLD AUTO: 0.54 K/UL — LOW (ref 1–3.3)
LYMPHOCYTES # BLD AUTO: 11.5 % — LOW (ref 13–44)
MCHC RBC-ENTMCNC: 28.3 PG — SIGNIFICANT CHANGE UP (ref 27–34)
MCHC RBC-ENTMCNC: 33.8 GM/DL — SIGNIFICANT CHANGE UP (ref 32–36)
MCV RBC AUTO: 83.7 FL — SIGNIFICANT CHANGE UP (ref 80–100)
MONOCYTES # BLD AUTO: 0.44 K/UL — SIGNIFICANT CHANGE UP (ref 0–0.9)
MONOCYTES NFR BLD AUTO: 9.4 % — SIGNIFICANT CHANGE UP (ref 2–14)
NEUTROPHILS # BLD AUTO: 3.62 K/UL — SIGNIFICANT CHANGE UP (ref 1.8–7.4)
NEUTROPHILS NFR BLD AUTO: 77 % — SIGNIFICANT CHANGE UP (ref 43–77)
NRBC # BLD: 0 /100 WBCS — SIGNIFICANT CHANGE UP (ref 0–0)
PLATELET # BLD AUTO: 127 K/UL — LOW (ref 150–400)
POTASSIUM SERPL-MCNC: 3.1 MMOL/L — LOW (ref 3.5–5.3)
POTASSIUM SERPL-MCNC: 3.3 MMOL/L — LOW (ref 3.5–5.3)
POTASSIUM SERPL-SCNC: 3.1 MMOL/L — LOW (ref 3.5–5.3)
POTASSIUM SERPL-SCNC: 3.3 MMOL/L — LOW (ref 3.5–5.3)
PROT SERPL-MCNC: 6.3 GM/DL — SIGNIFICANT CHANGE UP (ref 6–8.3)
RBC # BLD: 3.99 M/UL — LOW (ref 4.2–5.8)
RBC # FLD: 13.9 % — SIGNIFICANT CHANGE UP (ref 10.3–14.5)
SODIUM SERPL-SCNC: 142 MMOL/L — SIGNIFICANT CHANGE UP (ref 135–145)
SODIUM SERPL-SCNC: 142 MMOL/L — SIGNIFICANT CHANGE UP (ref 135–145)
VANCOMYCIN TROUGH SERPL-MCNC: 8.4 UG/ML — LOW (ref 10–20)
WBC # BLD: 4.7 K/UL — SIGNIFICANT CHANGE UP (ref 3.8–10.5)
WBC # FLD AUTO: 4.7 K/UL — SIGNIFICANT CHANGE UP (ref 3.8–10.5)

## 2018-04-07 PROCEDURE — 99233 SBSQ HOSP IP/OBS HIGH 50: CPT

## 2018-04-07 RX ORDER — ALPRAZOLAM 0.25 MG
0.5 TABLET ORAL ONCE
Qty: 0 | Refills: 0 | Status: DISCONTINUED | OUTPATIENT
Start: 2018-04-07 | End: 2018-04-07

## 2018-04-07 RX ORDER — POTASSIUM CHLORIDE 20 MEQ
40 PACKET (EA) ORAL ONCE
Qty: 0 | Refills: 0 | Status: COMPLETED | OUTPATIENT
Start: 2018-04-07 | End: 2018-04-07

## 2018-04-07 RX ADMIN — Medication 650 MILLIGRAM(S): at 21:52

## 2018-04-07 RX ADMIN — ONDANSETRON 8 MILLIGRAM(S): 8 TABLET, FILM COATED ORAL at 13:20

## 2018-04-07 RX ADMIN — Medication 650 MILLIGRAM(S): at 20:52

## 2018-04-07 RX ADMIN — BUDESONIDE AND FORMOTEROL FUMARATE DIHYDRATE 2 PUFF(S): 160; 4.5 AEROSOL RESPIRATORY (INHALATION) at 17:35

## 2018-04-07 RX ADMIN — Medication 250 MILLIGRAM(S): at 13:02

## 2018-04-07 RX ADMIN — Medication 0.5 MILLIGRAM(S): at 20:09

## 2018-04-07 RX ADMIN — Medication 50 MILLIGRAM(S): at 17:34

## 2018-04-07 RX ADMIN — Medication 650 MILLIGRAM(S): at 13:05

## 2018-04-07 RX ADMIN — Medication 50 MILLIGRAM(S): at 06:13

## 2018-04-07 RX ADMIN — Medication 650 MILLIGRAM(S): at 04:21

## 2018-04-07 RX ADMIN — Medication 250 MILLIGRAM(S): at 22:30

## 2018-04-07 RX ADMIN — SODIUM CHLORIDE 100 MILLILITER(S): 9 INJECTION INTRAMUSCULAR; INTRAVENOUS; SUBCUTANEOUS at 17:34

## 2018-04-07 RX ADMIN — Medication 250 MILLIGRAM(S): at 06:14

## 2018-04-07 RX ADMIN — SODIUM CHLORIDE 100 MILLILITER(S): 9 INJECTION INTRAMUSCULAR; INTRAVENOUS; SUBCUTANEOUS at 04:21

## 2018-04-07 RX ADMIN — ONDANSETRON 8 MILLIGRAM(S): 8 TABLET, FILM COATED ORAL at 04:21

## 2018-04-07 RX ADMIN — Medication 650 MILLIGRAM(S): at 05:21

## 2018-04-07 RX ADMIN — Medication 40 MILLIEQUIVALENT(S): at 17:34

## 2018-04-07 NOTE — PROGRESS NOTE ADULT - SUBJECTIVE AND OBJECTIVE BOX
Previous providers stated:  "Patient is a 42y old  Male who presents with a chief complaint of Sepsis after Colonoscopy (2018 23:59)    HPI:  42 year old man with PMH Asthma (intubated as a child for asthma attack), Crohn's on Stelara, and PSH Ileocecal resection 2/2 Crohn's disease in  at [facility] presents with complaint of fever, chills, mild abdominal pain, and nausea since earlier today.  He states he went for his yearly Colonoscopy today and began to feel sick on the way home.  He denies diarrhea, bloody stool.  He says his abdominal pain has mostly subsided and still feels fever with chills.  Seen by surgery in ED, consult appreciated.  Of note, he states he had a similar problem last year after ingesting contrast (gadolinium?) for MRI; did not have contrast yesterday, only Colonoscopy prep.    CT ab/plv and US show Cholelithiasis without Cholecystitis.  Pt febrile to 103.1. 23% bandemia on labs, lactate 2.1, potassium 2.8.  Given aztreonam and vanco in ED. (2018 23:59)"       INTERVAL HPI/OVERNIGHT EVENTS:        Allergies    ibuprofen (Unknown)  iodine (Anaphylaxis)  penicillin (Unknown)  tetracycline (Unknown)    Intolerances        ROS: all systems reviewed and wnl, except    Vital Signs Last 24 Hrs  T(C): 36.6 (2018 17:22), Max: 37.3 (2018 05:35)  T(F): 97.9 (2018 17:22), Max: 99.1 (2018 05:35)  HR: 69 (2018 17:22) (69 - 87)  BP: 134/90 (2018 17:22) (125/72 - 137/92)  BP(mean): --  RR: 17 (2018 17:22) (17 - 18)  SpO2: 95% (2018 17:22) (95% - 98%)  CAPILLARY BLOOD GLUCOSE          -06 @ 07:01  -   @ 07:00  --------------------------------------------------------  IN: 2880 mL / OUT: 700 mL / NET: 2180 mL            PHYSICAL EXAMINATION:  PHYSICAL EXAM:  GENERAL: NAD, well-groomed, well-developed  HEAD:  Atraumatic, Normocephalic  EYES: EOMI, PERRLA, conjunctiva and sclera clear  ENMT: No tonsillar erythema, exudates, or enlargement; Moist mucous membranes, Good dentition, No lesions  NECK: Supple, No JVD, Normal thyroid  NERVOUS SYSTEM:  Alert & Oriented X3, Good concentration; Motor Strength 5/5 B/L upper and lower extremities; DTRs 2+ intact and symmetric  CHEST/LUNG: Clear to percussion bilaterally; No rales, rhonchi, wheezing, or rubs  HEART: Regular rate and rhythm; No murmurs, rubs, or gallops  ABDOMEN: Soft, Nontender, Nondistended; Bowel sounds present  EXTREMITIES:  2+ Peripheral Pulses, No clubbing, cyanosis, or edema  LYMPH: No lymphadenopathy noted  SKIN: No rashes or lesions          LABS:                        11.3   4.70  )-----------( 127      ( 2018 06:44 )             33.4     04-07    142  |  109<H>  |  6<L>  ----------------------------<  118<H>  3.1<L>   |  27  |  0.75    Ca    7.7<L>      2018 06:44  Phos  2.2     04-06  Mg     1.3     04-06    TPro  6.3  /  Alb  2.8<L>  /  TBili  0.8  /  DBili  .20  /  AST  40<H>  /  ALT  49  /  AlkPhos  78  04-07      Urinalysis Basic - ( 2018 19:34 )    Color: Yellow / Appearance: Clear / S.005 / pH: x  Gluc: x / Ketone: Negative  / Bili: Negative / Urobili: Negative mg/dL   Blood: x / Protein: Negative mg/dL / Nitrite: Negative   Leuk Esterase: Negative / RBC: x / WBC x   Sq Epi: x / Non Sq Epi: x / Bacteria: x                    RADIOLOGY & ADDITIONAL TESTS:        MEDICATIONS  (STANDING):  aztreonam  IVPB 1000 milliGRAM(s) IV Intermittent every 12 hours  buDESOnide 160 MICROgram(s)/formoterol 4.5 MICROgram(s) Inhaler 2 Puff(s) Inhalation two times a day  sodium chloride 0.9%. 1000 milliLiter(s) (100 mL/Hr) IV Continuous <Continuous>  vancomycin  IVPB 1000 milliGRAM(s) IV Intermittent every 8 hours    MEDICATIONS  (PRN):  acetaminophen   Tablet 650 milliGRAM(s) Oral every 6 hours PRN For Temp greater than 38 C (100.4 F)  acetaminophen   Tablet. 650 milliGRAM(s) Oral every 6 hours PRN Headache  ALBUTerol    90 MICROgram(s) HFA Inhaler 2 Puff(s) Inhalation every 6 hours PRN Shortness of Breath and/or Wheezing  ondansetron Injectable 8 milliGRAM(s) IV Push every 8 hours PRN Nausea and/or Vomiting        ASSESSMENT AND PLAN:  HEATHER DELGADO [id number]  SEPSIS, HYPOKALEMIA: SEPSIS, HYPOKALEMIA  SYNCOPE: SYNCOPE  Back spasm  Asthma  Crohns disease  Sepsis  Crohn's disease of ileum, unspecified complication: Crohn&#x27;s disease of ileum, unspecified complication  Preventive measure: Preventive measure  Mild persistent asthma without complication: Mild persistent asthma without complication  Crohn's disease with complication, unspecified gastrointestinal tract location: Crohn&#x27;s disease with complication, unspecified gastrointestinal tract location  Calculus of gallbladder without cholecystitis without obstruction: Calculus of gallbladder without cholecystitis without obstruction  Hypokalemia: Hypokalemia  Sepsis, due to unspecified organism: Sepsis, due to unspecified organism  Hypokalemia        POCT  Blood Glucose  Comprehensive Metabolic Panel  Lactate, Blood  Lactate, Blood  Complete Blood Count + Automated Diff  Activated Partial Thromboplastin Time  Prothrombin Time and INR, Plasma  Urinalysis  Culture - Blood  Culture - Blood  Culture - Urine  sodium chloride 0.9% lock flush  12 Lead ECG  Diet, NPO  sodium chloride 0.9% Bolus  Xray Chest 1 View AP/PA.  CT Abdomen and Pelvis No Cont  aztreonam  IVPB  vancomycin  IVPB  Troponin I, Serum  Creatine Kinase, Serum  CKMB Mass Assay  acetaminophen   Tablet.  ondansetron Injectable  Nucleated RBC  Troponin I, Serum  Creatine Kinase, Serum  CKMB Mass Assay  Influenza Virus A and B Rapid Antigen  ([place])  Manual Differential  potassium chloride    Tablet ER  potassium chloride  10 mEq/100 mL IVPB  Rapid Respiratory Viral Panel  ([place])  (Floor[place])  US Abdomen Complete  Diet, Clear Liquid  Diet, Full Liquid  Diet, Regular  ondansetron Injectable  buDESOnide 160 MICROgram(s)/formoterol 4.5 MICROgram(s) Inhaler  ALBUTerol    90 MICROgram(s) HFA Inhaler  sodium chloride 0.9%.  Culture - Stool  Clostridium difficile Toxin by PCR  Ova and Parasites  aztreonam  IVPB  vancomycin  IVPB  acetaminophen   Tablet  Complete Blood Count  Comprehensive Metabolic Panel  Magnesium, Serum  Phosphorus Level, Serum  (ADM OVERRIDE)  Lactate, Blood  Lactate, Blood  Diet, Clear Liquid  magnesium sulfate  IVPB  potassium acid phosphate/sodium acid phosphate tablet (K-PHOS No. 2)  potassium chloride   Powder  acetaminophen   Tablet.  Complete Blood Count + Automated Diff  Basic Metabolic Panel  C-Reactive Protein, Serum  Procalcitonin, Serum  Vancomycin Level, Trough  Diet, NPO  Xray Kidney Ureter Bladder  potassium phosphate IVPB  ALPRAZolam  Hepatic Function Panel  Nucleated RBC  Diet, Full Liquid  Vancomycin Level, Trough  potassium chloride    Tablet ER  Basic Metabolic Panel Previous providers stated:  "Patient is a 42y old  Male who presents with a chief complaint of Sepsis after Colonoscopy (2018 23:59)    HPI:  42 year old man with PMH Asthma (intubated as a child for asthma attack), Crohn's on Stelara, and PSH Ileocecal resection 2/2 Crohn's disease in  at [facility] presents with complaint of fever, chills, mild abdominal pain, and nausea since earlier today.  He states he went for his yearly Colonoscopy today and began to feel sick on the way home.  He denies diarrhea, bloody stool.  He says his abdominal pain has mostly subsided and still feels fever with chills.  Seen by surgery in ED, consult appreciated.  Of note, he states he had a similar problem last year after ingesting contrast (gadolinium?) for MRI; did not have contrast yesterday, only Colonoscopy prep.    CT ab/plv and US show Cholelithiasis without Cholecystitis.  Pt febrile to 103.1. 23% bandemia on labs, lactate 2.1, potassium 2.8.  Given aztreonam and vanco in ED. (2018 23:59)"       INTERVAL HPI/OVERNIGHT EVENTS:  Needed alprazolam last night to sleep,  as felt anxious and thought was going to have a panic attack. Feeling anxious again, requeting same.  Afebrile today.  Tolerating full liquid diet.        Allergies    ibuprofen (Unknown)  iodine (Anaphylaxis)  penicillin (Unknown)  tetracycline (Unknown)    Intolerances        ROS: all systems reviewed and wnl, except    Vital Signs Last 24 Hrs  T(C): 36.6 (2018 17:22), Max: 37.3 (2018 05:35)  T(F): 97.9 (2018 17:22), Max: 99.1 (2018 05:35)  HR: 69 (2018 17:22) (69 - 87)  BP: 134/90 (2018 17:22) (125/72 - 137/92)  BP(mean): --  RR: 17 (2018 17:22) (17 - 18)  SpO2: 95% (2018 17:22) (95% - 98%)  CAPILLARY BLOOD GLUCOSE          04-06 @ 07:01  -  - @ 07:00  --------------------------------------------------------  IN: 2880 mL / OUT: 700 mL / NET: 2180 mL            PHYSICAL EXAMINATION:  PHYSICAL EXAM:  GENERAL: NAD, well-groomed, well-developed  HEAD:  Atraumatic, Normocephalic  EYES: EOMI, conjunctiva and sclera clear  ENMT:  Moist mucous membranes, Good dentition, No lesions  NECK: Supple, No JVD, Normal thyroid  NERVOUS SYSTEM:  Alert & Oriented X3,  CHEST/LUNG: Clear  bilaterally posteriorly; No rales, rhonchi, wheezing, or rubs  HEART: Regular rate and rhythm; No murmurs, rubs, or gallops  ABDOMEN: Soft, Nontender, Nondistended; Bowel sounds present. Midline scar  EXTREMITIES:   No clubbing, cyanosis, or edema  SKIN: No rashes or lesions          LABS:                        11.3   4.70  )-----------( 127      ( 2018 06:44 )             33.4     04-07    142  |  109<H>  |  6<L>  ----------------------------<  118<H>  3.1<L>   |  27  |  0.75    Ca    7.7<L>      2018 06:44  Phos  2.2     04-06  Mg     1.3     04-06    TPro  6.3  /  Alb  2.8<L>  /  TBili  0.8  /  DBili  .20  /  AST  40<H>  /  ALT  49  /  AlkPhos  78  04-07      Urinalysis Basic - ( 2018 19:34 )    Color: Yellow / Appearance: Clear / S.005 / pH: x  Gluc: x / Ketone: Negative  / Bili: Negative / Urobili: Negative mg/dL   Blood: x / Protein: Negative mg/dL / Nitrite: Negative   Leuk Esterase: Negative / RBC: x / WBC x   Sq Epi: x / Non Sq Epi: x / Bacteria: x                    RADIOLOGY & ADDITIONAL TESTS:        MEDICATIONS  (STANDING):  aztreonam  IVPB 1000 milliGRAM(s) IV Intermittent every 12 hours  buDESOnide 160 MICROgram(s)/formoterol 4.5 MICROgram(s) Inhaler 2 Puff(s) Inhalation two times a day  sodium chloride 0.9%. 1000 milliLiter(s) (100 mL/Hr) IV Continuous <Continuous>  vancomycin  IVPB 1000 milliGRAM(s) IV Intermittent every 8 hours    MEDICATIONS  (PRN):  acetaminophen   Tablet 650 milliGRAM(s) Oral every 6 hours PRN For Temp greater than 38 C (100.4 F)  acetaminophen   Tablet. 650 milliGRAM(s) Oral every 6 hours PRN Headache  ALBUTerol    90 MICROgram(s) HFA Inhaler 2 Puff(s) Inhalation every 6 hours PRN Shortness of Breath and/or Wheezing  ondansetron Injectable 8 milliGRAM(s) IV Push every 8 hours PRN Nausea and/or Vomiting        ASSESSMENT AND PLAN:  HEATHER DELGADO [id number]  SEPSIS, HYPOKALEMIA: SEPSIS, HYPOKALEMIA  SYNCOPE: SYNCOPE  Back spasm  Asthma  Crohns disease  Sepsis  Crohn's disease of ileum, unspecified complication: Crohn&#x27;s disease of ileum, unspecified complication  Preventive measure: Preventive measure  Mild persistent asthma without complication: Mild persistent asthma without complication  Crohn's disease with complication, unspecified gastrointestinal tract location: Crohn&#x27;s disease with complication, unspecified gastrointestinal tract location  Calculus of gallbladder without cholecystitis without obstruction: Calculus of gallbladder without cholecystitis without obstruction  Hypokalemia: Hypokalemia  Sepsis, due to unspecified organism: Sepsis, due to unspecified organism  Hypokalemia

## 2018-04-07 NOTE — PROGRESS NOTE ADULT - SUBJECTIVE AND OBJECTIVE BOX
Gastroenterology  Patient seen and examined bedside resting comfortably.  No complaints offered. Abdominal pain is well controlled.  Denies nausea and vomiting. Tolerating diet.  Normal flatus/BM.     T(F): 97.8 (04-07-18 @ 11:10), Max: 101.7 (04-06-18 @ 16:10)  HR: 75 (04-07-18 @ 11:10) (75 - 87)  BP: 137/92 (04-07-18 @ 11:10) (118/73 - 137/92)  RR: 18 (04-07-18 @ 11:10) (18 - 18)  SpO2: 97% (04-07-18 @ 11:10) (95% - 98%)  Wt(kg): --  CAPILLARY BLOOD GLUCOSE    PHYSICAL EXAM:  General: NAD, WDWN.   Neuro:  Alert & responsive  HEENT: NCAT, EOMI, conjunctiva clear  CV: +S1+S2 regular rate and rhythm  Lung: clear to ausculation bilaterally, respirations nonlabored, good inspiratory effort  Abdomen: soft, Non tender, No Distension. Normal active BS  Extremities: no pedal edema or calf tenderness noted     LABS:                        11.3   4.70  )-----------( 127      ( 07 Apr 2018 06:44 )             33.4     04-07    142  |  109<H>  |  6<L>  ----------------------------<  118<H>  3.1<L>   |  27  |  0.75    Ca    7.7<L>      07 Apr 2018 06:44  Phos  2.2     04-06  Mg     1.3     04-06    TPro  6.3  /  Alb  2.8<L>  /  TBili  0.8  /  DBili  .20  /  AST  40<H>  /  ALT  49  /  AlkPhos  78  04-07    LIVER FUNCTIONS - ( 07 Apr 2018 06:44 )  Alb: 2.8 g/dL / Pro: 6.3 gm/dL / ALK PHOS: 78 U/L / ALT: 49 U/L / AST: 40 U/L / GGT: x           PT/INR - ( 05 Apr 2018 18:13 )   PT: 12.4 sec;   INR: 1.13 ratio         PTT - ( 05 Apr 2018 18:13 )  PTT:28.7 sec  I&O's Detail    06 Apr 2018 07:01  -  07 Apr 2018 07:00  --------------------------------------------------------  IN:    Oral Fluid: 1130 mL    sodium chloride 0.9%.: 1000 mL    Solution: 50 mL    Solution: 250 mL    Solution: 200 mL    Solution: 250 mL  Total IN: 2880 mL    OUT:    Emesis: 100 mL    Voided: 600 mL  Total OUT: 700 mL    Total NET: 2180 mL        04-07 @ 06:44    142 | 109 | 6  /7.7 | -- | --  _______________________/  3.1 | 27 | 0.75                           \par

## 2018-04-08 ENCOUNTER — TRANSCRIPTION ENCOUNTER (OUTPATIENT)
Age: 43
End: 2018-04-08

## 2018-04-08 VITALS
TEMPERATURE: 98 F | OXYGEN SATURATION: 98 % | DIASTOLIC BLOOD PRESSURE: 93 MMHG | SYSTOLIC BLOOD PRESSURE: 135 MMHG | HEART RATE: 72 BPM | RESPIRATION RATE: 18 BRPM

## 2018-04-08 LAB — VANCOMYCIN TROUGH SERPL-MCNC: 13.4 UG/ML — SIGNIFICANT CHANGE UP (ref 10–20)

## 2018-04-08 PROCEDURE — 99239 HOSP IP/OBS DSCHRG MGMT >30: CPT

## 2018-04-08 RX ORDER — METRONIDAZOLE 500 MG
1 TABLET ORAL
Qty: 14 | Refills: 0 | OUTPATIENT
Start: 2018-04-08 | End: 2018-04-14

## 2018-04-08 RX ORDER — METRONIDAZOLE 500 MG
1 TABLET ORAL
Qty: 14 | Refills: 0
Start: 2018-04-08 | End: 2018-04-14

## 2018-04-08 RX ORDER — MOXIFLOXACIN HYDROCHLORIDE TABLETS, 400 MG 400 MG/1
1 TABLET, FILM COATED ORAL
Qty: 14 | Refills: 0 | OUTPATIENT
Start: 2018-04-08 | End: 2018-04-14

## 2018-04-08 RX ORDER — CIPROFLOXACIN LACTATE 400MG/40ML
1 VIAL (ML) INTRAVENOUS
Qty: 14 | Refills: 0
Start: 2018-04-08 | End: 2018-04-14

## 2018-04-08 RX ADMIN — Medication 250 MILLIGRAM(S): at 05:33

## 2018-04-08 RX ADMIN — Medication 650 MILLIGRAM(S): at 07:45

## 2018-04-08 RX ADMIN — SODIUM CHLORIDE 100 MILLILITER(S): 9 INJECTION INTRAMUSCULAR; INTRAVENOUS; SUBCUTANEOUS at 05:32

## 2018-04-08 RX ADMIN — Medication 250 MILLIGRAM(S): at 13:46

## 2018-04-08 RX ADMIN — BUDESONIDE AND FORMOTEROL FUMARATE DIHYDRATE 2 PUFF(S): 160; 4.5 AEROSOL RESPIRATORY (INHALATION) at 05:32

## 2018-04-08 RX ADMIN — Medication 50 MILLIGRAM(S): at 05:33

## 2018-04-08 RX ADMIN — ONDANSETRON 8 MILLIGRAM(S): 8 TABLET, FILM COATED ORAL at 05:01

## 2018-04-08 RX ADMIN — Medication 650 MILLIGRAM(S): at 08:30

## 2018-04-08 NOTE — PROGRESS NOTE ADULT - SUBJECTIVE AND OBJECTIVE BOX
INTERVAL HPI/OVERNIGHT EVENTS:  Pt seen and examined at bedside.     Allergies/Intolerance: ibuprofen (Unknown)  iodine (Anaphylaxis)  penicillin (Unknown)  tetracycline (Unknown)      MEDICATIONS  (STANDING):  aztreonam  IVPB 1000 milliGRAM(s) IV Intermittent every 12 hours  buDESOnide 160 MICROgram(s)/formoterol 4.5 MICROgram(s) Inhaler 2 Puff(s) Inhalation two times a day  vancomycin  IVPB 1000 milliGRAM(s) IV Intermittent every 8 hours    MEDICATIONS  (PRN):  acetaminophen   Tablet 650 milliGRAM(s) Oral every 6 hours PRN For Temp greater than 38 C (100.4 F)  acetaminophen   Tablet. 650 milliGRAM(s) Oral every 6 hours PRN Headache  ALBUTerol    90 MICROgram(s) HFA Inhaler 2 Puff(s) Inhalation every 6 hours PRN Shortness of Breath and/or Wheezing  ondansetron Injectable 8 milliGRAM(s) IV Push every 8 hours PRN Nausea and/or Vomiting        ROS: all systems reviewed and wnl      PHYSICAL EXAMINATION:  Vital Signs Last 24 Hrs  T(C): 36.4 (08 Apr 2018 12:00), Max: 36.7 (08 Apr 2018 05:20)  T(F): 97.6 (08 Apr 2018 12:00), Max: 98 (08 Apr 2018 05:20)  HR: 72 (08 Apr 2018 12:00) (56 - 72)  BP: 135/93 (08 Apr 2018 12:00) (134/90 - 149/95)  BP(mean): --  RR: 18 (08 Apr 2018 12:00) (17 - 18)  SpO2: 98% (08 Apr 2018 12:00) (95% - 98%)  CAPILLARY BLOOD GLUCOSE          04-07 @ 07:01  -  04-08 @ 07:00  --------------------------------------------------------  IN: 1250 mL / OUT: 0 mL / NET: 1250 mL        GENERAL: stable on 2-E, tolerates regular diet well, no fevers or abdominal pains.    NECK: supple, No JVD  CHEST/LUNG: clear to auscultation bilaterally; no rales, rhonchi, or wheezing b/l  HEART: normal S1, S2  ABDOMEN: BS+, soft, ND, NT   EXTREMITIES:  pulses palpable; no clubbing, cyanosis, or edema b/l LEs  SKIN: no rashes or lesions      LABS:                        11.3   4.70  )-----------( 127      ( 07 Apr 2018 06:44 )             33.4     04-07    142  |  108  |  6<L>  ----------------------------<  108<H>  3.3<L>   |  26  |  0.84    Ca    8.1<L>      07 Apr 2018 19:27    TPro  6.3  /  Alb  2.8<L>  /  TBili  0.8  /  DBili  .20  /  AST  40<H>  /  ALT  49  /  AlkPhos  78  04-07        C Diff by PCR Result: Kelley (04-06-18 @ 17:42)

## 2018-04-08 NOTE — PROGRESS NOTE ADULT - PROBLEM SELECTOR PROBLEM 2
Calculus of gallbladder without cholecystitis without obstruction
Crohn's disease of ileum, unspecified complication
Crohn's disease of ileum, unspecified complication

## 2018-04-08 NOTE — PROGRESS NOTE ADULT - SUBJECTIVE AND OBJECTIVE BOX
Gastroenterolgy  Patient seen and examined bedside resting comfortably.  No complaints offered.   No abdominal pain  Denies nausea and vomiting. Tolerating regular diet.  Normal flatus/BM.     T(F): 97.6 (04-08-18 @ 12:00), Max: 98 (04-08-18 @ 05:20)  HR: 72 (04-08-18 @ 12:00) (56 - 72)  BP: 135/93 (04-08-18 @ 12:00) (134/90 - 149/95)  RR: 18 (04-08-18 @ 12:00) (17 - 18)  SpO2: 98% (04-08-18 @ 12:00) (95% - 98%)  Wt(kg): --  CAPILLARY BLOOD GLUCOSE        PHYSICAL EXAM:  General: NAD, WDWN.   Neuro:  Alert and responsive  HEENT: NCAT, EOMI, conjunctiva clear  CV: +S1+S2 regular rate and rhythm  Lung: clear to ausculation bilaterally, respirations nonlabored, good inspiratory effort  Abdomen: soft, NonTender, No distention Normal active BS  Extremities: no cyanosis, clubbing or edema    LABS:                        11.3   4.70  )-----------( 127      ( 07 Apr 2018 06:44 )             33.4     04-07    142  |  108  |  6<L>  ----------------------------<  108<H>  3.3<L>   |  26  |  0.84    Ca    8.1<L>      07 Apr 2018 19:27    TPro  6.3  /  Alb  2.8<L>  /  TBili  0.8  /  DBili  .20  /  AST  40<H>  /  ALT  49  /  AlkPhos  78  04-07    LIVER FUNCTIONS - ( 07 Apr 2018 06:44 )  Alb: 2.8 g/dL / Pro: 6.3 gm/dL / ALK PHOS: 78 U/L / ALT: 49 U/L / AST: 40 U/L / GGT: x             I&O's Detail    07 Apr 2018 07:01  -  08 Apr 2018 07:00  --------------------------------------------------------  IN:    sodium chloride 0.9%.: 1000 mL    Solution: 50 mL    Solution: 200 mL  Total IN: 1250 mL    OUT:  Total OUT: 0 mL    Total NET: 1250 mL        04-07 @ 19:27    142 | 108 | 6  /8.1 | -- | --  _______________________/  3.3 | 26 | 0.84                           \par

## 2018-04-08 NOTE — DISCHARGE NOTE ADULT - CARE PLAN
Principal Discharge DX:	Sepsis  Goal:	resolution of sepsis  Assessment and plan of treatment:	Continue ciproflaxacin and flagyl antibiotics. Follow up with GI within 1 week of discharge date. Follow up with PCP.  Secondary Diagnosis:	Calculus of gallbladder without cholecystitis without obstruction  Assessment and plan of treatment:	Patient asymptomatic with normal LFT. No surgical intervention currently. Please follow up with PCP for continued management. If abdominal pain  Secondary Diagnosis:	Crohns disease  Assessment and plan of treatment:	Continue management with Stelara. Follow up with GI doctor, Peña Price, within 1 week of discharge date  Secondary Diagnosis:	Mild persistent asthma without complication  Assessment and plan of treatment:	Continue present home asthma regimen. Follow up with PCP. Principal Discharge DX:	Sepsis  Goal:	resolution of sepsis  Assessment and plan of treatment:	Continue ciproflaxacin and flagyl antibiotics two times a day for 7 days. Follow up with GI within 1 week of discharge date. Follow up with PCP.  Secondary Diagnosis:	Calculus of gallbladder without cholecystitis without obstruction  Assessment and plan of treatment:	Patient asymptomatic with normal LFT. No surgical intervention currently. Please follow up with PCP for continued management. If abdominal pain  Secondary Diagnosis:	Crohns disease  Assessment and plan of treatment:	Continue management with Stelara. Follow up with GI doctor, Peña Price, within 1 week of discharge date  Secondary Diagnosis:	Mild persistent asthma without complication  Assessment and plan of treatment:	Continue present home asthma regimen. Follow up with PCP.

## 2018-04-08 NOTE — PROGRESS NOTE ADULT - PROBLEM SELECTOR PROBLEM 1
Calculus of gallbladder without cholecystitis without obstruction
Crohn's disease of ileum, unspecified complication
Crohn's disease with complication, unspecified gastrointestinal tract location

## 2018-04-08 NOTE — DISCHARGE NOTE ADULT - PROVIDER TOKENS
FREE:[LAST:[GI Doctor],PHONE:[(   )    -],FAX:[(   )    -]],FREE:[LAST:[PCP],PHONE:[(   )    -],FAX:[(   )    -]]

## 2018-04-08 NOTE — DISCHARGE NOTE ADULT - HOSPITAL COURSE
42 year old man with PMH Asthma (intubated as a child for asthma attack), Crohn's on Stelara, and PSH Ileocecal resection 2/2 Crohn's disease in 2013 at [facility] presents with complaint of fever, chills, mild abdominal pain, and nausea since earlier today.  He states he went for his yearly Colonoscopy today and began to feel sick on the way home.  He denies diarrhea, bloody stool.  He says his abdominal pain has mostly subsided and still feels fever with chills.  Seen by surgery in ED, consult appreciated.  Of note, he states he had a similar problem last year after ingesting contrast (gadolinium?) for MRI; did not have contrast yesterday, only Colonoscopy prep. CT ab/plv and US show Cholelithiasis without Cholecystitis.  Pt febrile to 103.1. 23% bandemia on labs, lactate 2.1, potassium 2.8.  Given aztreonam and vanco in ED. Patient was admitted and continued with aztreonam and vancomycin. GI consulted. General surgery consulted. No surgical intervention for cholelithiasis as patient is asymptomatic with normal LFT. Cleared from GI and surgical standpoint. Patient currently feels well and stable for discharge.

## 2018-04-08 NOTE — DISCHARGE NOTE ADULT - SECONDARY DIAGNOSIS.
Calculus of gallbladder without cholecystitis without obstruction Crohns disease Mild persistent asthma without complication

## 2018-04-08 NOTE — DISCHARGE NOTE ADULT - PATIENT PORTAL LINK FT
You can access the [facility], offered by [facility], by registering with the following website: [email or web address]

## 2018-04-08 NOTE — DISCHARGE NOTE ADULT - MEDICATION SUMMARY - MEDICATIONS TO TAKE
I will START or STAY ON the medications listed below when I get home from the hospital:    Flagyl 500 mg oral tablet  -- 1 tab(s) by mouth 2 times a day   -- Do not drink alcoholic beverages when taking this medication.  Finish all this medication unless otherwise directed by prescriber.  May discolor urine or feces.    -- Indication: For Sepsis s/p colonoscopy    ondansetron 8 mg oral tablet  -- 1 tab(s) by mouth every 6 hours, As Needed - for nausea  --     -- Indication: For nausea/vomiting    Proventil HFA 90 mcg/inh inhalation aerosol  -- 2 puff(s) inhaled 4 times a day  -- Indication: For asthma    Advair Diskus 250 mcg-50 mcg inhalation powder  -- 2 puff(s) inhaled once a day  -- Indication: For asthma    Stelara 45 mg/0.5 mL subcutaneous solution  --  subcutaneous   -- Indication: For Crohn's disease of ileum, unspecified complication    Cipro 500 mg oral tablet  -- 1 tab(s) by mouth 2 times a day   -- Avoid prolonged or excessive exposure to direct and/or artificial sunlight while taking this medication.  Check with your doctor before becoming pregnant.  Do not take dairy products, antacids, or iron preparations within one hour of this medication.  Finish all this medication unless otherwise directed by prescriber.  Medication should be taken with plenty of water.    -- Indication: For Sepsis s/p colonoscopy

## 2018-04-08 NOTE — DISCHARGE NOTE ADULT - PLAN OF CARE
resolution of sepsis Continue ciproflaxacin and flagyl antibiotics. Follow up with GI within 1 week of discharge date. Follow up with PCP. Patient asymptomatic with normal LFT. No surgical intervention currently. Please follow up with PCP for continued management. If abdominal pain Continue management with Stelara. Follow up with GI doctor, Peña Price, within 1 week of discharge date Continue present home asthma regimen. Follow up with PCP. Continue ciproflaxacin and flagyl antibiotics two times a day for 7 days. Follow up with GI within 1 week of discharge date. Follow up with PCP.

## 2018-04-08 NOTE — PROGRESS NOTE ADULT - ASSESSMENT
Discharge to home. See med list and summary. Cipro and Flagyl for one more week.
FEVER with history of Crohn's s/p colonoscopy, Hx Of small bowel resection
FEVER with history of Crohn's s/p colonoscopy, Hx Of small bowel resection
Fever s/p colonoscopy yesterday. hx of Crohn's disease, s/p ileal resection; hx of asthma , not currently active., never intubated.
Patient seen and examined.  He continues to deny abdominal pain or history of post-prandial RUQ or epigastric pain.    Appears anxious  Abdomen - soft, non-tender, non-distended.  Healed midline scar.    LFTs WNL  CT scan and U/S images and report reviewed and reveal a gallstone within an otherwise normal appearing gallbladder.    Assessment: 42-year-old man, with history significant for Crohn's on immunosuppressive therapy, admitted with fever of unknown origin.  He was incidentally found to have cholelithiasis within an otherwise normal appearing gallbladder.    Plan:  - The gallbladder is unlikely to be the source of his fevers considering its normal appearance on imaging (CT and U/S), benign abdominal exam, and normal LFTs.  - No surgical intervention warranted for his gallstone considering that he is asymptomatic.  - Please re-consult PRN.

## 2018-04-09 ENCOUNTER — TRANSCRIPTION ENCOUNTER (OUTPATIENT)
Age: 43
End: 2018-04-09

## 2018-04-11 DIAGNOSIS — Z91.041 RADIOGRAPHIC DYE ALLERGY STATUS: ICD-10-CM

## 2018-04-11 DIAGNOSIS — Z88.1 ALLERGY STATUS TO OTHER ANTIBIOTIC AGENTS STATUS: ICD-10-CM

## 2018-04-11 DIAGNOSIS — K80.20 CALCULUS OF GALLBLADDER WITHOUT CHOLECYSTITIS WITHOUT OBSTRUCTION: ICD-10-CM

## 2018-04-11 DIAGNOSIS — K50.019 CROHN'S DISEASE OF SMALL INTESTINE WITH UNSPECIFIED COMPLICATIONS: ICD-10-CM

## 2018-04-11 DIAGNOSIS — T81.4XXA INFECTION FOLLOWING A PROCEDURE, INITIAL ENCOUNTER: ICD-10-CM

## 2018-04-11 DIAGNOSIS — Z88.6 ALLERGY STATUS TO ANALGESIC AGENT: ICD-10-CM

## 2018-04-11 DIAGNOSIS — Z88.0 ALLERGY STATUS TO PENICILLIN: ICD-10-CM

## 2018-04-11 DIAGNOSIS — K50.919 CROHN'S DISEASE, UNSPECIFIED, WITH UNSPECIFIED COMPLICATIONS: ICD-10-CM

## 2018-04-11 DIAGNOSIS — A41.9 SEPSIS, UNSPECIFIED ORGANISM: ICD-10-CM

## 2018-04-11 DIAGNOSIS — K50.90 CROHN'S DISEASE, UNSPECIFIED, WITHOUT COMPLICATIONS: ICD-10-CM

## 2018-04-11 DIAGNOSIS — J45.30 MILD PERSISTENT ASTHMA, UNCOMPLICATED: ICD-10-CM

## 2018-04-11 DIAGNOSIS — Z90.49 ACQUIRED ABSENCE OF OTHER SPECIFIED PARTS OF DIGESTIVE TRACT: ICD-10-CM

## 2018-04-11 DIAGNOSIS — K76.0 FATTY (CHANGE OF) LIVER, NOT ELSEWHERE CLASSIFIED: ICD-10-CM

## 2018-04-11 DIAGNOSIS — R16.2 HEPATOMEGALY WITH SPLENOMEGALY, NOT ELSEWHERE CLASSIFIED: ICD-10-CM
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
## 2018-04-11 DIAGNOSIS — Z79.51 LONG TERM (CURRENT) USE OF INHALED STEROIDS: ICD-10-CM

## 2018-04-11 DIAGNOSIS — E87.6 HYPOKALEMIA: ICD-10-CM

## 2018-04-11 LAB
CULTURE RESULTS: SIGNIFICANT CHANGE UP
CULTURE RESULTS: SIGNIFICANT CHANGE UP
SPECIMEN SOURCE: SIGNIFICANT CHANGE UP
SPECIMEN SOURCE: SIGNIFICANT CHANGE UP

## 2018-06-22 ENCOUNTER — EMERGENCY (EMERGENCY)
Facility: HOSPITAL | Age: 43
LOS: 0 days | Discharge: HOME | End: 2018-06-22
Attending: EMERGENCY MEDICINE
Payer: COMMERCIAL

## 2018-06-22 VITALS
WEIGHT: 220.02 LBS | OXYGEN SATURATION: 97 % | SYSTOLIC BLOOD PRESSURE: 145 MMHG | RESPIRATION RATE: 16 BRPM | TEMPERATURE: 101 F | HEART RATE: 99 BPM | HEIGHT: 70 IN | DIASTOLIC BLOOD PRESSURE: 88 MMHG

## 2018-06-22 VITALS
TEMPERATURE: 101 F | RESPIRATION RATE: 16 BRPM | SYSTOLIC BLOOD PRESSURE: 135 MMHG | OXYGEN SATURATION: 96 % | DIASTOLIC BLOOD PRESSURE: 78 MMHG | HEART RATE: 93 BPM

## 2018-06-22 DIAGNOSIS — R11.10 VOMITING, UNSPECIFIED: ICD-10-CM

## 2018-06-22 DIAGNOSIS — J45.909 UNSPECIFIED ASTHMA, UNCOMPLICATED: ICD-10-CM

## 2018-06-22 DIAGNOSIS — Z90.49 ACQUIRED ABSENCE OF OTHER SPECIFIED PARTS OF DIGESTIVE TRACT: Chronic | ICD-10-CM

## 2018-06-22 DIAGNOSIS — E86.0 DEHYDRATION: ICD-10-CM

## 2018-06-22 DIAGNOSIS — Z91.041 RADIOGRAPHIC DYE ALLERGY STATUS: ICD-10-CM

## 2018-06-22 DIAGNOSIS — Z88.6 ALLERGY STATUS TO ANALGESIC AGENT: ICD-10-CM

## 2018-06-22 DIAGNOSIS — K50.00 CROHN'S DISEASE OF SMALL INTESTINE WITHOUT COMPLICATIONS: ICD-10-CM

## 2018-06-22 DIAGNOSIS — Z88.0 ALLERGY STATUS TO PENICILLIN: ICD-10-CM

## 2018-06-22 LAB
ALBUMIN SERPL ELPH-MCNC: 3.2 G/DL — LOW (ref 3.3–5)
ALP SERPL-CCNC: 86 U/L — SIGNIFICANT CHANGE UP (ref 40–120)
ALT FLD-CCNC: 33 U/L — SIGNIFICANT CHANGE UP (ref 12–78)
ANION GAP SERPL CALC-SCNC: 12 MMOL/L — SIGNIFICANT CHANGE UP (ref 5–17)
AST SERPL-CCNC: 30 U/L — SIGNIFICANT CHANGE UP (ref 15–37)
BILIRUB SERPL-MCNC: 1 MG/DL — SIGNIFICANT CHANGE UP (ref 0.2–1.2)
BUN SERPL-MCNC: 8 MG/DL — SIGNIFICANT CHANGE UP (ref 7–23)
CALCIUM SERPL-MCNC: 8.4 MG/DL — LOW (ref 8.5–10.1)
CHLORIDE SERPL-SCNC: 102 MMOL/L — SIGNIFICANT CHANGE UP (ref 96–108)
CO2 SERPL-SCNC: 23 MMOL/L — SIGNIFICANT CHANGE UP (ref 22–31)
CREAT SERPL-MCNC: 1.13 MG/DL — SIGNIFICANT CHANGE UP (ref 0.5–1.3)
GLUCOSE SERPL-MCNC: 107 MG/DL — HIGH (ref 70–99)
HCT VFR BLD CALC: 39.3 % — SIGNIFICANT CHANGE UP (ref 39–50)
HGB BLD-MCNC: 13.7 G/DL — SIGNIFICANT CHANGE UP (ref 13–17)
LACTATE SERPL-SCNC: 1.1 MMOL/L — SIGNIFICANT CHANGE UP (ref 0.7–2)
LIDOCAIN IGE QN: 82 U/L — SIGNIFICANT CHANGE UP (ref 73–393)
MCHC RBC-ENTMCNC: 27.6 PG — SIGNIFICANT CHANGE UP (ref 27–34)
MCHC RBC-ENTMCNC: 34.9 GM/DL — SIGNIFICANT CHANGE UP (ref 32–36)
MCV RBC AUTO: 79.1 FL — LOW (ref 80–100)
NRBC # BLD: 0 /100 WBCS — SIGNIFICANT CHANGE UP (ref 0–0)
PLATELET # BLD AUTO: 160 K/UL — SIGNIFICANT CHANGE UP (ref 150–400)
POTASSIUM SERPL-MCNC: 3.1 MMOL/L — LOW (ref 3.5–5.3)
POTASSIUM SERPL-SCNC: 3.1 MMOL/L — LOW (ref 3.5–5.3)
PROT SERPL-MCNC: 7.8 GM/DL — SIGNIFICANT CHANGE UP (ref 6–8.3)
RBC # BLD: 4.97 M/UL — SIGNIFICANT CHANGE UP (ref 4.2–5.8)
RBC # FLD: 12.9 % — SIGNIFICANT CHANGE UP (ref 10.3–14.5)
SODIUM SERPL-SCNC: 137 MMOL/L — SIGNIFICANT CHANGE UP (ref 135–145)
TROPONIN I SERPL-MCNC: <.015 NG/ML — SIGNIFICANT CHANGE UP (ref 0.01–0.04)
WBC # BLD: 5.53 K/UL — SIGNIFICANT CHANGE UP (ref 3.8–10.5)
WBC # FLD AUTO: 5.53 K/UL — SIGNIFICANT CHANGE UP (ref 3.8–10.5)

## 2018-06-22 PROCEDURE — 99284 EMERGENCY DEPT VISIT MOD MDM: CPT

## 2018-06-22 RX ORDER — FAMOTIDINE 10 MG/ML
20 INJECTION INTRAVENOUS ONCE
Qty: 0 | Refills: 0 | Status: COMPLETED | OUTPATIENT
Start: 2018-06-22 | End: 2018-06-22

## 2018-06-22 RX ORDER — METOCLOPRAMIDE HCL 10 MG
10 TABLET ORAL ONCE
Qty: 0 | Refills: 0 | Status: COMPLETED | OUTPATIENT
Start: 2018-06-22 | End: 2018-06-22

## 2018-06-22 RX ORDER — POTASSIUM CHLORIDE 20 MEQ
40 PACKET (EA) ORAL ONCE
Qty: 0 | Refills: 0 | Status: COMPLETED | OUTPATIENT
Start: 2018-06-22 | End: 2018-06-22

## 2018-06-22 RX ADMIN — Medication 40 MILLIEQUIVALENT(S): at 19:34

## 2018-06-22 RX ADMIN — FAMOTIDINE 20 MILLIGRAM(S): 10 INJECTION INTRAVENOUS at 17:32

## 2018-06-22 RX ADMIN — Medication 10 MILLIGRAM(S): at 17:31

## 2018-06-22 RX ADMIN — Medication 125 MILLIGRAM(S): at 17:31

## 2018-06-22 NOTE — ED PROVIDER NOTE - OBJECTIVE STATEMENT
41 yo M with dehydration.  Pt. says he's been vomiting for the last day and a half 2/2 to his Crohn's.  He gets this every so often.  No acute inciting event.  He feels well otherwise.  Vomitus was non-stop, nbnb.   ROS: negative for fever, cough, headache, chest pain, shortness of breath, abd pain, nausea, vomiting, diarrhea, rash, paresthesia, and weakness--all other systems reviewed are negative.   PMH: negative; Meds: Denies; SH: Denies smoking/drinking/drug use 43 yo M with dehydration.  Pt. says he's been vomiting for the last day and a half 2/2 to his Crohn's.  He gets this every so often.  No acute inciting event.  He feels well otherwise.  Vomitus was non-stop, nbnb.   ROS: negative for fever, cough, headache, chest pain, shortness of breath, abd pain, diarrhea, rash, paresthesia, and weakness--all other systems reviewed are negative.   PMH: Crohn's, asthma; Meds: albuterol, advair, stelara injections; SH: Denies smoking/drinking/drug use

## 2018-06-22 NOTE — ED PROVIDER NOTE - PHYSICAL EXAMINATION
Vitals: WNL  Gen: AAOx3, NAD, sitting comfortably in stretcher, non-toxic, no active vomiting  Head: ncat, perrla, eomi b/l  Neck: supple, no lymphadenopathy, no midline deviation  Heart: rrr, no m/r/g  Lungs: CTA b/l, no rales/ronchi/wheezes  Abd: soft, nontender, non-distended, no rebound or guarding  Ext: no clubbing/cyanosis/edema  Neuro: sensation and muscle strength intact b/l, steady gait

## 2018-06-22 NOTE — ED PROVIDER NOTE - PROGRESS NOTE DETAILS
Results reported to patient--potassium low at 3.1 replaced orally, remainder of labs wnl, no indication for imaging at this time  Pt. reports feeling completely better after meds  pt. agrees to f/u with primary care outpt.  pt. understands to return to ED if symptoms worsen; will d/c pt. febrile with unknown source, belly is soft and non-tender, pt. fells well now and wants to go home, WBC count is normal  pt. instructed to f/u closely with pcp and GI doctors, he understands to return to ER if symptoms worsen and not delay care

## 2018-06-22 NOTE — ED ADULT TRIAGE NOTE - CHIEF COMPLAINT QUOTE
He been feeling dehydrated, lethargic, and  exhausted. symptoms for 1 and a half day He has a history of Crohn's  Disease

## 2018-06-22 NOTE — ED ADULT NURSE REASSESSMENT NOTE - NS ED NURSE REASSESS COMMENT FT1
Dr. Basurto aware of pts temp. Pt states he feels better and will take Tylenol when he gets home. Pt and attending spoke before leaving ER.

## 2018-12-21 ENCOUNTER — EMERGENCY (EMERGENCY)
Facility: HOSPITAL | Age: 43
LOS: 0 days | Discharge: HOME | End: 2018-12-21
Attending: EMERGENCY MEDICINE
Payer: COMMERCIAL

## 2018-12-21 VITALS
WEIGHT: 220.02 LBS | TEMPERATURE: 98 F | RESPIRATION RATE: 17 BRPM | SYSTOLIC BLOOD PRESSURE: 146 MMHG | DIASTOLIC BLOOD PRESSURE: 89 MMHG | OXYGEN SATURATION: 98 % | HEIGHT: 70 IN | HEART RATE: 69 BPM

## 2018-12-21 DIAGNOSIS — Z88.9 ALLERGY STATUS TO UNSPECIFIED DRUGS, MEDICAMENTS AND BIOLOGICAL SUBSTANCES: ICD-10-CM

## 2018-12-21 DIAGNOSIS — Z79.2 LONG TERM (CURRENT) USE OF ANTIBIOTICS: ICD-10-CM

## 2018-12-21 DIAGNOSIS — S01.511A LACERATION WITHOUT FOREIGN BODY OF LIP, INITIAL ENCOUNTER: ICD-10-CM
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
## 2018-12-21 DIAGNOSIS — Z91.041 RADIOGRAPHIC DYE ALLERGY STATUS: ICD-10-CM

## 2018-12-21 DIAGNOSIS — J45.909 UNSPECIFIED ASTHMA, UNCOMPLICATED: ICD-10-CM

## 2018-12-21 DIAGNOSIS — Z79.51 LONG TERM (CURRENT) USE OF INHALED STEROIDS: ICD-10-CM

## 2018-12-21 DIAGNOSIS — Z90.49 ACQUIRED ABSENCE OF OTHER SPECIFIED PARTS OF DIGESTIVE TRACT: Chronic | ICD-10-CM
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
## 2018-12-21 DIAGNOSIS — K50.90 CROHN'S DISEASE, UNSPECIFIED, WITHOUT COMPLICATIONS: ICD-10-CM

## 2018-12-21 DIAGNOSIS — M62.830 MUSCLE SPASM OF BACK: ICD-10-CM

## 2018-12-21 DIAGNOSIS — Z88.0 ALLERGY STATUS TO PENICILLIN: ICD-10-CM

## 2018-12-21 PROCEDURE — 99283 EMERGENCY DEPT VISIT LOW MDM: CPT

## 2018-12-21 RX ORDER — ACETAMINOPHEN 500 MG
650 TABLET ORAL ONCE
Qty: 0 | Refills: 0 | Status: COMPLETED | OUTPATIENT
Start: 2018-12-21 | End: 2018-12-21

## 2018-12-21 RX ORDER — TETANUS TOXOID, REDUCED DIPHTHERIA TOXOID AND ACELLULAR PERTUSSIS VACCINE, ADSORBED 5; 2.5; 8; 8; 2.5 [IU]/.5ML; [IU]/.5ML; UG/.5ML; UG/.5ML; UG/.5ML
0.5 SUSPENSION INTRAMUSCULAR ONCE
Qty: 0 | Refills: 0 | Status: COMPLETED | OUTPATIENT
Start: 2018-12-21 | End: 2018-12-21

## 2018-12-21 RX ADMIN — Medication 650 MILLIGRAM(S): at 12:49

## 2018-12-21 RX ADMIN — Medication 300 MILLIGRAM(S): at 11:22

## 2018-12-21 RX ADMIN — TETANUS TOXOID, REDUCED DIPHTHERIA TOXOID AND ACELLULAR PERTUSSIS VACCINE, ADSORBED 0.5 MILLILITER(S): 5; 2.5; 8; 8; 2.5 SUSPENSION INTRAMUSCULAR at 12:42

## 2018-12-21 RX ADMIN — Medication 650 MILLIGRAM(S): at 11:22

## 2018-12-21 NOTE — ED PROVIDER NOTE - ENMT, MLM
Airway patent, Nasal mucosa clear. Mouth with normal mucosa. Throat has no vesicles, no oropharyngeal exudates and uvula is midline; teeth intact

## 2018-12-21 NOTE — ED PROVIDER NOTE - OBJECTIVE STATEMENT
44 yo male presents with lip laceration after fall while in the shower. Patient denies chest pain, sob, abdominal pain, loc, head injury, weakness, neck pain.

## 2018-12-21 NOTE — ED ADULT TRIAGE NOTE - CHIEF COMPLAINT QUOTE
pt states, " I slipped in shower and fell hitting my chin, my lip has a cut and I think my tooth cracked " denies head injury

## 2019-01-15 NOTE — ED PROCEDURE NOTE - CPROC ED TIME OUT STATEMENT1
“Patient's name, , procedure and correct site were confirmed during the [place] Timeout.”
“Patient's name, , procedure and correct site were confirmed during the [place] Timeout.”

## 2019-01-22 NOTE — PATIENT PROFILE ADULT. - NS PRO OT REFERRAL QUES 1 YN
Pt A&O to ED with c/o cough since Sat. States that it is productive and is blood tinged at times. States he has had a fever of 101.5 at home last night. States he has been fatigued. Denies n/v/d. Reports hx of CHF and bronchiolitis obliterans. Rhonchi present bilaterally, SpO2 96% on RA.
no

## 2022-12-19 ENCOUNTER — TRANSCRIPTION ENCOUNTER (OUTPATIENT)
Age: 47
End: 2022-12-19

## 2022-12-20 ENCOUNTER — RESULT REVIEW (OUTPATIENT)
Age: 47
End: 2022-12-20

## 2022-12-20 ENCOUNTER — TRANSCRIPTION ENCOUNTER (OUTPATIENT)
Age: 47
End: 2022-12-20

## 2022-12-20 ENCOUNTER — INPATIENT (INPATIENT)
Facility: HOSPITAL | Age: 47
LOS: 0 days | Discharge: HOME | End: 2022-12-21
Attending: SURGERY | Admitting: SURGERY
Payer: COMMERCIAL

## 2022-12-20 VITALS
HEIGHT: 69 IN | WEIGHT: 225.09 LBS | RESPIRATION RATE: 18 BRPM | TEMPERATURE: 98 F | HEART RATE: 64 BPM | OXYGEN SATURATION: 97 % | DIASTOLIC BLOOD PRESSURE: 97 MMHG | SYSTOLIC BLOOD PRESSURE: 144 MMHG

## 2022-12-20 DIAGNOSIS — Z90.49 ACQUIRED ABSENCE OF OTHER SPECIFIED PARTS OF DIGESTIVE TRACT: Chronic | ICD-10-CM

## 2022-12-20 DIAGNOSIS — K81.9 CHOLECYSTITIS, UNSPECIFIED: ICD-10-CM

## 2022-12-20 LAB
ALBUMIN SERPL ELPH-MCNC: 3.7 G/DL — SIGNIFICANT CHANGE UP (ref 3.3–5)
ALP SERPL-CCNC: 106 U/L — SIGNIFICANT CHANGE UP (ref 40–120)
ALT FLD-CCNC: 25 U/L — SIGNIFICANT CHANGE UP (ref 12–78)
ANION GAP SERPL CALC-SCNC: 6 MMOL/L — SIGNIFICANT CHANGE UP (ref 5–17)
APPEARANCE UR: CLEAR — SIGNIFICANT CHANGE UP
APTT BLD: 33.1 SEC — SIGNIFICANT CHANGE UP (ref 27.5–35.5)
AST SERPL-CCNC: 14 U/L — LOW (ref 15–37)
BASOPHILS # BLD AUTO: 0.03 K/UL — SIGNIFICANT CHANGE UP (ref 0–0.2)
BASOPHILS NFR BLD AUTO: 0.5 % — SIGNIFICANT CHANGE UP (ref 0–2)
BILIRUB SERPL-MCNC: 0.4 MG/DL — SIGNIFICANT CHANGE UP (ref 0.2–1.2)
BILIRUB UR-MCNC: NEGATIVE — SIGNIFICANT CHANGE UP
BLD GP AB SCN SERPL QL: SIGNIFICANT CHANGE UP
BUN SERPL-MCNC: 17 MG/DL — SIGNIFICANT CHANGE UP (ref 7–23)
CALCIUM SERPL-MCNC: 8.9 MG/DL — SIGNIFICANT CHANGE UP (ref 8.5–10.1)
CHLORIDE SERPL-SCNC: 110 MMOL/L — HIGH (ref 96–108)
CO2 SERPL-SCNC: 23 MMOL/L — SIGNIFICANT CHANGE UP (ref 22–31)
COLOR SPEC: YELLOW — SIGNIFICANT CHANGE UP
CREAT SERPL-MCNC: 0.77 MG/DL — SIGNIFICANT CHANGE UP (ref 0.5–1.3)
DIFF PNL FLD: ABNORMAL
EGFR: 110 ML/MIN/1.73M2 — SIGNIFICANT CHANGE UP
EOSINOPHIL # BLD AUTO: 0.01 K/UL — SIGNIFICANT CHANGE UP (ref 0–0.5)
EOSINOPHIL NFR BLD AUTO: 0.2 % — SIGNIFICANT CHANGE UP (ref 0–6)
FLUAV AG NPH QL: DETECTED
FLUBV AG NPH QL: SIGNIFICANT CHANGE UP
GLUCOSE SERPL-MCNC: 137 MG/DL — HIGH (ref 70–99)
GLUCOSE UR QL: NEGATIVE MG/DL — SIGNIFICANT CHANGE UP
HCT VFR BLD CALC: 39.5 % — SIGNIFICANT CHANGE UP (ref 39–50)
HGB BLD-MCNC: 13.2 G/DL — SIGNIFICANT CHANGE UP (ref 13–17)
IMM GRANULOCYTES NFR BLD AUTO: 0.5 % — SIGNIFICANT CHANGE UP (ref 0–0.9)
INR BLD: 1.14 RATIO — SIGNIFICANT CHANGE UP (ref 0.88–1.16)
KETONES UR-MCNC: ABNORMAL
LACTATE SERPL-SCNC: 0.5 MMOL/L — LOW (ref 0.7–2)
LEUKOCYTE ESTERASE UR-ACNC: NEGATIVE — SIGNIFICANT CHANGE UP
LIDOCAIN IGE QN: 140 U/L — SIGNIFICANT CHANGE UP (ref 73–393)
LYMPHOCYTES # BLD AUTO: 0.74 K/UL — LOW (ref 1–3.3)
LYMPHOCYTES # BLD AUTO: 11.8 % — LOW (ref 13–44)
MCHC RBC-ENTMCNC: 27.4 PG — SIGNIFICANT CHANGE UP (ref 27–34)
MCHC RBC-ENTMCNC: 33.4 G/DL — SIGNIFICANT CHANGE UP (ref 32–36)
MCV RBC AUTO: 82 FL — SIGNIFICANT CHANGE UP (ref 80–100)
MONOCYTES # BLD AUTO: 0.27 K/UL — SIGNIFICANT CHANGE UP (ref 0–0.9)
MONOCYTES NFR BLD AUTO: 4.3 % — SIGNIFICANT CHANGE UP (ref 2–14)
NEUTROPHILS # BLD AUTO: 5.2 K/UL — SIGNIFICANT CHANGE UP (ref 1.8–7.4)
NEUTROPHILS NFR BLD AUTO: 82.7 % — HIGH (ref 43–77)
NITRITE UR-MCNC: NEGATIVE — SIGNIFICANT CHANGE UP
NRBC # BLD: 0 /100 WBCS — SIGNIFICANT CHANGE UP (ref 0–0)
PH UR: 6 — SIGNIFICANT CHANGE UP (ref 5–8)
PLATELET # BLD AUTO: 220 K/UL — SIGNIFICANT CHANGE UP (ref 150–400)
POTASSIUM SERPL-MCNC: 3.4 MMOL/L — LOW (ref 3.5–5.3)
POTASSIUM SERPL-SCNC: 3.4 MMOL/L — LOW (ref 3.5–5.3)
PROT SERPL-MCNC: 7.8 GM/DL — SIGNIFICANT CHANGE UP (ref 6–8.3)
PROT UR-MCNC: 30 MG/DL
PROTHROM AB SERPL-ACNC: 13.6 SEC — HIGH (ref 10.5–13.4)
RBC # BLD: 4.82 M/UL — SIGNIFICANT CHANGE UP (ref 4.2–5.8)
RBC # FLD: 13.4 % — SIGNIFICANT CHANGE UP (ref 10.3–14.5)
RBC CASTS # UR COMP ASSIST: NEGATIVE /HPF — SIGNIFICANT CHANGE UP (ref 0–4)
SARS-COV-2 RNA SPEC QL NAA+PROBE: SIGNIFICANT CHANGE UP
SODIUM SERPL-SCNC: 139 MMOL/L — SIGNIFICANT CHANGE UP (ref 135–145)
SP GR SPEC: 1.01 — SIGNIFICANT CHANGE UP (ref 1.01–1.02)
TROPONIN I, HIGH SENSITIVITY RESULT: 6.2 NG/L — SIGNIFICANT CHANGE UP
UROBILINOGEN FLD QL: NEGATIVE MG/DL — SIGNIFICANT CHANGE UP
WBC # BLD: 6.28 K/UL — SIGNIFICANT CHANGE UP (ref 3.8–10.5)
WBC # FLD AUTO: 6.28 K/UL — SIGNIFICANT CHANGE UP (ref 3.8–10.5)
WBC UR QL: NEGATIVE — SIGNIFICANT CHANGE UP

## 2022-12-20 PROCEDURE — 88304 TISSUE EXAM BY PATHOLOGIST: CPT | Mod: 26

## 2022-12-20 PROCEDURE — 93010 ELECTROCARDIOGRAM REPORT: CPT

## 2022-12-20 PROCEDURE — 47562 LAPAROSCOPIC CHOLECYSTECTOMY: CPT

## 2022-12-20 PROCEDURE — 74176 CT ABD & PELVIS W/O CONTRAST: CPT | Mod: 26,MA

## 2022-12-20 PROCEDURE — 47562 LAPAROSCOPIC CHOLECYSTECTOMY: CPT | Mod: 80

## 2022-12-20 PROCEDURE — 76700 US EXAM ABDOM COMPLETE: CPT | Mod: 26

## 2022-12-20 PROCEDURE — 99223 1ST HOSP IP/OBS HIGH 75: CPT | Mod: 57

## 2022-12-20 PROCEDURE — 71045 X-RAY EXAM CHEST 1 VIEW: CPT | Mod: 26

## 2022-12-20 PROCEDURE — 99285 EMERGENCY DEPT VISIT HI MDM: CPT

## 2022-12-20 RX ORDER — GEMFIBROZIL 600 MG
1 TABLET ORAL
Qty: 0 | Refills: 0 | DISCHARGE

## 2022-12-20 RX ORDER — MORPHINE SULFATE 50 MG/1
2 CAPSULE, EXTENDED RELEASE ORAL EVERY 4 HOURS
Refills: 0 | Status: DISCONTINUED | OUTPATIENT
Start: 2022-12-20 | End: 2022-12-20

## 2022-12-20 RX ORDER — OXYCODONE HYDROCHLORIDE 5 MG/1
5 TABLET ORAL EVERY 6 HOURS
Refills: 0 | Status: DISCONTINUED | OUTPATIENT
Start: 2022-12-20 | End: 2022-12-21

## 2022-12-20 RX ORDER — METRONIDAZOLE 500 MG
500 TABLET ORAL ONCE
Refills: 0 | Status: COMPLETED | OUTPATIENT
Start: 2022-12-20 | End: 2022-12-20

## 2022-12-20 RX ORDER — SODIUM CHLORIDE 9 MG/ML
1000 INJECTION INTRAMUSCULAR; INTRAVENOUS; SUBCUTANEOUS ONCE
Refills: 0 | Status: COMPLETED | OUTPATIENT
Start: 2022-12-20 | End: 2022-12-20

## 2022-12-20 RX ORDER — SODIUM CHLORIDE 9 MG/ML
1000 INJECTION, SOLUTION INTRAVENOUS
Refills: 0 | Status: DISCONTINUED | OUTPATIENT
Start: 2022-12-20 | End: 2022-12-21

## 2022-12-20 RX ORDER — USTEKINUMAB 45 MG/.5ML
0 INJECTION, SOLUTION SUBCUTANEOUS
Qty: 0 | Refills: 0 | DISCHARGE

## 2022-12-20 RX ORDER — AMLODIPINE BESYLATE 2.5 MG/1
1 TABLET ORAL
Qty: 0 | Refills: 0 | DISCHARGE

## 2022-12-20 RX ORDER — HYDROMORPHONE HYDROCHLORIDE 2 MG/ML
1 INJECTION INTRAMUSCULAR; INTRAVENOUS; SUBCUTANEOUS ONCE
Refills: 0 | Status: DISCONTINUED | OUTPATIENT
Start: 2022-12-20 | End: 2022-12-20

## 2022-12-20 RX ORDER — METRONIDAZOLE 500 MG
500 TABLET ORAL EVERY 8 HOURS
Refills: 0 | Status: DISCONTINUED | OUTPATIENT
Start: 2022-12-20 | End: 2022-12-21

## 2022-12-20 RX ORDER — FLUTICASONE PROPIONATE AND SALMETEROL 50; 250 UG/1; UG/1
2 POWDER ORAL; RESPIRATORY (INHALATION)
Qty: 0 | Refills: 0 | DISCHARGE

## 2022-12-20 RX ORDER — CIPROFLOXACIN LACTATE 400MG/40ML
400 VIAL (ML) INTRAVENOUS EVERY 12 HOURS
Refills: 0 | Status: DISCONTINUED | OUTPATIENT
Start: 2022-12-20 | End: 2022-12-21

## 2022-12-20 RX ORDER — ALBUTEROL 90 UG/1
2 AEROSOL, METERED ORAL
Qty: 0 | Refills: 0 | DISCHARGE

## 2022-12-20 RX ORDER — SODIUM CHLORIDE 9 MG/ML
1000 INJECTION, SOLUTION INTRAVENOUS
Refills: 0 | Status: DISCONTINUED | OUTPATIENT
Start: 2022-12-20 | End: 2022-12-20

## 2022-12-20 RX ORDER — ACETAMINOPHEN 500 MG
975 TABLET ORAL EVERY 6 HOURS
Refills: 0 | Status: DISCONTINUED | OUTPATIENT
Start: 2022-12-20 | End: 2022-12-21

## 2022-12-20 RX ORDER — HEPARIN SODIUM 5000 [USP'U]/ML
5000 INJECTION INTRAVENOUS; SUBCUTANEOUS EVERY 12 HOURS
Refills: 0 | Status: DISCONTINUED | OUTPATIENT
Start: 2022-12-20 | End: 2022-12-21

## 2022-12-20 RX ORDER — LISINOPRIL 2.5 MG/1
1 TABLET ORAL
Qty: 0 | Refills: 0 | DISCHARGE

## 2022-12-20 RX ORDER — ONDANSETRON 8 MG/1
4 TABLET, FILM COATED ORAL ONCE
Refills: 0 | Status: COMPLETED | OUTPATIENT
Start: 2022-12-20 | End: 2022-12-20

## 2022-12-20 RX ORDER — HYDROMORPHONE HYDROCHLORIDE 2 MG/ML
1 INJECTION INTRAMUSCULAR; INTRAVENOUS; SUBCUTANEOUS
Refills: 0 | Status: DISCONTINUED | OUTPATIENT
Start: 2022-12-20 | End: 2022-12-20

## 2022-12-20 RX ORDER — METRONIDAZOLE 500 MG
TABLET ORAL
Refills: 0 | Status: DISCONTINUED | OUTPATIENT
Start: 2022-12-20 | End: 2022-12-21

## 2022-12-20 RX ORDER — MORPHINE SULFATE 50 MG/1
4 CAPSULE, EXTENDED RELEASE ORAL ONCE
Refills: 0 | Status: DISCONTINUED | OUTPATIENT
Start: 2022-12-20 | End: 2022-12-20

## 2022-12-20 RX ADMIN — Medication 100 MILLIGRAM(S): at 21:37

## 2022-12-20 RX ADMIN — MORPHINE SULFATE 4 MILLIGRAM(S): 50 CAPSULE, EXTENDED RELEASE ORAL at 03:18

## 2022-12-20 RX ADMIN — SODIUM CHLORIDE 125 MILLILITER(S): 9 INJECTION, SOLUTION INTRAVENOUS at 14:20

## 2022-12-20 RX ADMIN — SODIUM CHLORIDE 1000 MILLILITER(S): 9 INJECTION INTRAMUSCULAR; INTRAVENOUS; SUBCUTANEOUS at 03:05

## 2022-12-20 RX ADMIN — SODIUM CHLORIDE 1000 MILLILITER(S): 9 INJECTION INTRAMUSCULAR; INTRAVENOUS; SUBCUTANEOUS at 07:54

## 2022-12-20 RX ADMIN — Medication 100 MILLIGRAM(S): at 13:00

## 2022-12-20 RX ADMIN — Medication 200 MILLIGRAM(S): at 11:19

## 2022-12-20 RX ADMIN — HYDROMORPHONE HYDROCHLORIDE 1 MILLIGRAM(S): 2 INJECTION INTRAMUSCULAR; INTRAVENOUS; SUBCUTANEOUS at 05:04

## 2022-12-20 RX ADMIN — HYDROMORPHONE HYDROCHLORIDE 1 MILLIGRAM(S): 2 INJECTION INTRAMUSCULAR; INTRAVENOUS; SUBCUTANEOUS at 05:20

## 2022-12-20 RX ADMIN — Medication 975 MILLIGRAM(S): at 23:27

## 2022-12-20 RX ADMIN — HYDROMORPHONE HYDROCHLORIDE 1 MILLIGRAM(S): 2 INJECTION INTRAMUSCULAR; INTRAVENOUS; SUBCUTANEOUS at 07:38

## 2022-12-20 RX ADMIN — MORPHINE SULFATE 4 MILLIGRAM(S): 50 CAPSULE, EXTENDED RELEASE ORAL at 03:03

## 2022-12-20 RX ADMIN — HYDROMORPHONE HYDROCHLORIDE 1 MILLIGRAM(S): 2 INJECTION INTRAMUSCULAR; INTRAVENOUS; SUBCUTANEOUS at 09:00

## 2022-12-20 RX ADMIN — ONDANSETRON 4 MILLIGRAM(S): 8 TABLET, FILM COATED ORAL at 09:13

## 2022-12-20 RX ADMIN — HYDROMORPHONE HYDROCHLORIDE 1 MILLIGRAM(S): 2 INJECTION INTRAMUSCULAR; INTRAVENOUS; SUBCUTANEOUS at 12:29

## 2022-12-20 NOTE — ED PROVIDER NOTE - CLINICAL SUMMARY MEDICAL DECISION MAKING FREE TEXT BOX
Pt PMHx of crohns s/p colon resection presenting with right sided abdominal pain a/w nausea/vomiting,  (+) prior history of surgery. No hematemesis or hematochezia/melena. Pt is hemodynamically stable, mentating well. Exam concerning for appendicitis vs cholecystitis vs crohns flare    Considered DDx but unlikely due to the clinical presentation and exam:  ACS,  Lower lobe pneumonia,  SBO,  Inflammatory bowel disease, Irritable Bowel Syndrome,   AAA rupture,  dissection,  viscous perforation,  gastroenteritis,  gastritis/PUD/perforated ulcer, DKA,  urolithiasis,  UTI/cystitis/pyelonephritis.  PLAN:  -IV fluids, analgesia & anti-emetics PRN   -EKG, troponin for atypical ACS, CBC, CMP, lipase, UA,   -CT abd/pelvis    -Observation and reassessment, surgical consultation if necessary.

## 2022-12-20 NOTE — ED ADULT TRIAGE NOTE - CHIEF COMPLAINT QUOTE
pt c/o abd pain this evening.  denies N/V/D.  pt states he took tylenol 2 tabs at 10pm.  pt was at [facility] for same but LWOBS pt c/o abd pain this evening.  denies N/V/D.  (pt grabbing mid-abd)  pt states he took tylenol 2 tabs at 10pm.  pt was at [facility] for same but LWOBS pt c/o abd pain this evening.  denies N/V/D.  (pt grabbing mid-abd)  pt states he took tylenol 2 tabs at 10pm.  pt was at [facility] for same but LWOBS  (PMH-Crohn's, colon resection)

## 2022-12-20 NOTE — CONSULT NOTE ADULT - ASSESSMENT
A/P: 47M h/o crohns, recent flu, with acute cholecystitis   -- recommend admit, IV abx, IV hydration, OR today  -- pt currently refusing admission for surgery, awaiting wife's input and final decision  -- for now, NPO, IV hydration, IV antibiotics, pain control, antiemetics, labs  -- discussed with Dr. Teague and Dr. Nina

## 2022-12-20 NOTE — ED PROVIDER NOTE - PHYSICAL EXAMINATION
VITAL SIGNS: I have reviewed nursing notes and confirm.   GEN: Well-developed; well-nourished; in no acute distress. Speaking full sentences.  SKIN: Warm, pink, no rash, no diaphoresis, no cyanosis, well perfused.   HEAD: Normocephalic; atraumatic. No scalp lacerations, no abrasions.  NECK: Supple; non tender.   EYES: Pupils 3mm equal, round, reactive to light and accomodation, conjunctiva and sclera clear. Extra-ocular movements intact bilaterally.  ENT: No nasal discharge; airway clear. Trachea is midline. ORAL: No oropharyngeal exudates or erythema. Normal dentition.  CV: Regular rate and rhythm. S1, S2 normal; no murmurs, gallops, or rubs. No lower extremity pitting edema bilaterally. Capillary refill < 2 seconds throughout. Distal pulses intact 2+ throughout.  RESP: CTA bilaterally. No wheezes, rales, or rhonchi.   ABD: Normal bowel sounds, soft, non-distended, (+) mild right sided abdominal pain, no rebound, no guarding, no rigidity, no hepatosplenomegaly. No CVA tenderness bilaterally.  MSK: Normal range of motion and movement of all 4 extremities. No joint or muscular pain throughout. No clubbing.   BACK: No thoracolumbar midline or paravertebral tenderness. No step-offs or obvious deformities.  NEURO: Alert & oriented x 3, Grossly unremarkable. Sensory and motor intact throughout. No focal deficits. Gait: Fluid. Normal speech and coordination.   PSYCH: Cooperative, appropriate.

## 2022-12-20 NOTE — PATIENT PROFILE ADULT - FUNCTIONAL ASSESSMENT - DAILY ACTIVITY 2.
Infusion Nursing Note:  Agapito Fairbanks presents today for IVF.    Patient seen by provider today: No   present during visit today: Not Applicable.    Note: N/A.    Intravenous Access:  Peripheral IV placed.    Treatment Conditions:  Not Applicable.      Post Infusion Assessment:  Patient tolerated infusion without incident.  Site patent and intact, free from redness, edema or discomfort.  No evidence of extravasations.  Access discontinued per protocol.       Discharge Plan:   Discharge instructions reviewed with: Patient.  Patient and/or family verbalized understanding of discharge instructions and all questions answered.  AVS to patient via FeusdT.  Patient will return 7/20/20 for next appointment.   Patient discharged in stable condition accompanied by: self.  Departure Mode: Ambulatory.    Kaleigh Roberts RN                         4 = No assist / stand by assistance

## 2022-12-20 NOTE — ED PROVIDER NOTE - CARE PLAN
1 Principal Discharge DX:	Acute cholecystitis   Principal Discharge DX:	Acute cholecystitis  Secondary Diagnosis:	Influenza

## 2022-12-20 NOTE — ED PROVIDER NOTE - PROGRESS NOTE DETAILS
Pt was seen and treated by Dr. Teague ct abd/pelvis showed gallbladder stone in the neck US of abd is pending Pt is alert and oriented x 3 sts he ate schultz and eggs for breakfast yesterday morning and right side abd pain started in the afternoon. Pt appears very comfortable now. SX PA is here eval pt us abd shows gallbladder distension and stone in the neck of the gallbladder. SX PA is here eval pt us abd shows gallbladder distension and stone in the neck of the gallbladder. PA sts admit pt to Dr. Ho spears. Pt is alert and oriented x 3 speaking in clear full sentences pt is explained both ct of abd/pelvis and us of abd report and importance to be admitted to surgery Pt however sts he is waiting for his wife arrival to decide if he is going to be admitted. Pt now agrees to be admitted.

## 2022-12-20 NOTE — H&P ADULT - PROBLEM SELECTOR PLAN 1
Admit  OR booked for nati henry  NPO  IV hydration  IV antibiotics  Pain meds  Antiemetics   Pt states he is on NO daily medications for crohns (or remote h/o asthma) at this time, takes stelara q60d

## 2022-12-20 NOTE — ED ADULT NURSE NOTE - OBJECTIVE STATEMENT
Patient A&O x3 came in with complaints of abdominal pain that started around 10pm tonight. no n/v/d. Pt took  2 tylenol around 10pm without relief. Pt was at [facility] and  left without being seen after being told there was a 9 hour wait. Patient has Crohn's, asthma, htn, colon resection. no cp, no sob. no urinary symptoms.

## 2022-12-20 NOTE — ED ADULT NURSE NOTE - CHIEF COMPLAINT QUOTE
pt c/o abd pain this evening.  denies N/V/D.  (pt grabbing mid-abd)  pt states he took tylenol 2 tabs at 10pm.  pt was at [facility] for same but LWOBS  (PMH-Crohn's, colon resection)

## 2022-12-20 NOTE — H&P ADULT - HISTORY OF PRESENT ILLNESS
Pt is a 47 year old male with PMH crohns dz s/p colon rxn 2014 in [facility] with Dr. Araujo who presents to the ED c/o 10/10 RUQ and epigastric pain x 1 day with associated nausea. Denies prior similar episodes. Took tylenol with no relief. Denies fever, chills, chest pain, sob. Was dx with flu A last Tuesday, but reports complete resolution of related symptoms.

## 2022-12-20 NOTE — PROGRESS NOTE ADULT - SUBJECTIVE AND OBJECTIVE BOX
Post-op check    S/P laparoscopic cholecystectomy POD#0  Pt seen and examined at bedside. Admits to incisional pain well controlled with medication. Voiding. Ambulating. Denies chest pain, shortness of breath, nausea/ vomiting, and dizziness.     Vital Signs Last 24 Hrs  T(F): 97.8 (12-20-22 @ 22:30), Max: 98.6 (12-20-22 @ 17:57)  HR: 67 (12-20-22 @ 22:30)  BP: 121/82 (12-20-22 @ 22:30)  RR: 17 (12-20-22 @ 22:30)  SpO2: 95% (12-20-22 @ 22:30)      CONSTITUTIONAL: Alert, NAD  RESPIRATORY: Clear to auscultation bilaterally, respirations nonlabored  CARDIOVASCULAR: S1S2, Regular rate and rhythm  GASTROINTESTINAL: Dressings C/D/I, Nondistended, + Bowel sounds, soft, Appropriate incisional tenderness  MUSCULOSKELETAL: No calf tenderness, No edema      Assessment: 47M S/P laparoscopic cholecystectomy POD#0    Plan:   - local wound care  - DVT prophylaxis, Incentive Spirometer, OOB, Ambulating, pain control  - f/u labs   - will discuss with surgical attending

## 2022-12-20 NOTE — ED ADULT NURSE REASSESSMENT NOTE - NS ED NURSE REASSESS COMMENT FT1
pt A&OX3. meds admin as prescribed. pt taken by transport off unit to US with IV fluids running. vitals obtained and documented.

## 2022-12-20 NOTE — ED ADULT NURSE REASSESSMENT NOTE - NS ED NURSE REASSESS COMMENT FT1
pt A&OX3 and was seen by Surgical NICOLAS Diez. Pt was informed that he has a large gallstone at the neck of the gallbladder and surgery is recommended at this time. pt stated that he would like to consult with his family and his surgical team from previous surgeries to determine the best option for him. pt requested abx and pain management as txt for the time being. MD made aware. 1

## 2022-12-20 NOTE — ED ADULT NURSE REASSESSMENT NOTE - NS ED NURSE REASSESS COMMENT FT1
pt A&OX4 resting in bed. pt c/o pain 6/10. MD made aware. pt vitals stable. awaiting transport to US.

## 2022-12-20 NOTE — CONSULT NOTE ADULT - SUBJECTIVE AND OBJECTIVE BOX
HPI:  Pt is a 47 year old male with PMH crohns dz s/p colon rxn  in [facility] with Dr. Araujo who presents to the ED c/o 10/10 RUQ and epigastric pain x 1 day with associated nausea. Denies prior similar episodes. Took tylenol with no relief. Denies fever, chills, chest pain, sob. Was dx with flu A last Tuesday, but reports complete resolution of related symptoms.     PAST MEDICAL & SURGICAL HISTORY:  Crohns disease  Asthma  Back spasm  History of colon resection    Review of Systems:  Contained within HPI      Allergies  ibuprofen (Unknown)  iodine (Anaphylaxis)  penicillin (Unknown)  tetracycline (Unknown)    SOCIAL HISTORY   Employed as a  at a hospital.  Denies drug use, ETOH use.     Vital Signs Last 24 Hrs  T(C): 36.6 (20 Dec 2022 08:00), Max: 36.6 (20 Dec 2022 08:00)  T(F): 97.9 (20 Dec 2022 08:00), Max: 97.9 (20 Dec 2022 08:00)  HR: 58 (20 Dec 2022 08:00) (57 - 64)  BP: 145/95 (20 Dec 2022 08:00) (144/97 - 151/86)  RR: 18 (20 Dec 2022 04:29) (18 - 18)  SpO2: 97% (20 Dec 2022 04:29) (97% - 97%)    Physical Exam:  General: Appears stated age, well-groomed, well-nourished, mild distress  Eyes: White sclera   HENT: WNL, no JVD  Chest: CTA b/l, respirations nonlabored   Cardiovascular: S1S2 RRR  Abdomen: Soft, nondistended, TTP RUQ    Extremities: No calf tenderness b/l   Skin: No rash  Musculoskeletal: Normal strength  Neuro/Psych:  Alert and oriented x 3      LABS:                        13.2   6.28  )-----------( 220      ( 20 Dec 2022 03:58 )             39.5     12-20    139  |  110<H>  |  17  ----------------------------<  137<H>  3.4<L>   |  23  |  0.77    Ca    8.9      20 Dec 2022 03:58    TPro  7.8  /  Alb  3.7  /  TBili  0.4  /  DBili  x   /  AST  14<L>  /  ALT  25  /  AlkPhos  106  12-20      Urinalysis Basic - ( 20 Dec 2022 06:00 )    Color: Yellow / Appearance: Clear / S.010 / pH: x  Gluc: x / Ketone: Trace  / Bili: Negative / Urobili: Negative mg/dL   Blood: x / Protein: 30 mg/dL / Nitrite: Negative   Leuk Esterase: Negative / RBC: Negative /HPF / WBC Negative   Sq Epi: x / Non Sq Epi: x / Bacteria: x        RADIOLOGY & ADDITIONAL STUDIES:  < from: CT Abdomen and Pelvis No Cont (22 @ 05:40) >    FINDINGS: Absence of intravenous contrast limits evaluation of   vasculature and solid organs.    LOWER CHEST: Mild bibasilar dependent atelectasis.    LIVER: Within normal limits.  BILE DUCTS: Normal caliber.  GALLBLADDER: Distended containing gallstone measuring up to 1.8 cm in the   gallbladder neck. Consider correlation with right upper quadrant   ultrasound to exclude gallbladder disease.  SPLEEN: Within normal limits.  PANCREAS: Within normal limits.  ADRENALS: Within normal limits.  KIDNEYS/URETERS: Trace nonspecific bilateral perinephric stranding   without hydronephrosis. Incompletely characterized 1.2 cm right inferior   pole exophytic cystic lesion for which nonemergent renal ultrasound   correlation is advised.    BLADDER: Within normal limits.  REPRODUCTIVE ORGANS: No prostatomegaly.    BOWEL: Surgical anastomosis identified in the right side of abdomen. No   bowel obstruction.  PERITONEUM: No ascites.  VESSELS:  Within normal limits.  RETROPERITONEUM: Shotty bilateral pelvic and bilateral groin lymph nodes.  ABDOMINAL WALL: Small right groin hernia containing fat.  BONES: Multilevel degenerative changes of the spine, advanced at L5-S1   containing vacuum phenomena. Minimal anterolisthesis is of L5 on S1.   Stable calcification identified along left anterior proximal thigh muscle.    IMPRESSION:    Distended gallbladder containing gallstone in the gallbladder neck.   Consider correlation with right upper quadrant ultrasound to exclude   gallbladder disease.    Additional findings as mentioned above.                    --- End of Report ---        < end of copied text >      < from: US Abdomen Complete (US Abdomen Complete .) (22 @ 08:53) >  FINDINGS:  Liver: Increased echogenicity. Patent main portal vein with normal flow   direction.  Bile ducts: Normal caliber. Common bile duct measures 4 mm.  Gallbladder: Distended with gallstone at the neck. No focal tenderness.  Pancreas: Visualized portions are within normal limits.  Spleen: 12.2 cm. Within normal limits.  Right kidney: 11.1 cm. No hydronephrosis.  Left kidney: 10.7 cm. No hydronephrosis.  Ascites: None.  Aorta and IVC: Visualized portions are within normal limits.    IMPRESSION:  Distended gallbladder with gallstone at the neck.  No biliary ductal dilatation.  Recommend nuclear medicine hepatobiliary scan for further assessment.    --- End of Report ---    < end of copied text >

## 2022-12-20 NOTE — H&P ADULT - NS ATTEND AMEND GEN_ALL_CORE FT
Patient seen and examined  Labs and imaging reviewed  Acute cholecystitis  Risks, benefits, and alternatives discussed  OR for laparoscopic cholecystectomy

## 2022-12-20 NOTE — ED PROVIDER NOTE - OBJECTIVE STATEMENT
47M PMHx of Crohns disease s/p colo resection presenting with abdominal pain x 1 day. Described as right sided, abdominal pain, nonradiating, mild, a/w nausea. Denies any hematemesis, melena, hematochezia. Follows with GI at [facility]. Denies any chest pain,  shortness of breath,  headaches, fevers, chills, diarrhea ,constipation, weakness, syncope, hematuria, dysuria, urinary symptoms, subjective neurological deficits, numbness/tingling.

## 2022-12-20 NOTE — ED ADULT NURSE REASSESSMENT NOTE - NS ED NURSE REASSESS COMMENT FT1
Pt A&OX3 with wife at bedside. pt bloods drawn and sent to lab. meds started. will continue to monitor. pt requested to speak with surgical team. MD made aware.

## 2022-12-20 NOTE — PATIENT PROFILE ADULT - FALL HARM RISK - UNIVERSAL INTERVENTIONS
Bed in lowest position, wheels locked, appropriate side rails in place/Call bell, personal items and telephone in reach/Instruct patient to call for assistance before getting out of bed or chair/Non-slip footwear when patient is out of bed/[place] to call system/Physically safe environment - no spills, clutter or unnecessary equipment/Purposeful Proactive Rounding/Room/bathroom lighting operational, light cord in reach

## 2022-12-20 NOTE — PATIENT PROFILE ADULT - FALL HARM RISK - CONCLUSION
Patient:   ALANA RAMEY            MRN: [id number]            FIN: [id number]              Age:   89 years     Sex:  MALE     :  22   Associated Diagnoses:   None   Author:   AIDEE GRIER     Chief Complaint:  _5.5 hemoglobin    History of present illness:  _89-year-old retired  with a history of hard of hearing, BKA, peripheral vascular disease, kidney stone, severe tricuspid regurgitation, A. fib on Eliquis, jaundice, BPH was sent from a nursing home for low hemoglobin.  He says he also has a history of leukemia.  He denies chest pain, shortness of breath, nausea vomiting diarrhea abdominal pain and dysuria.  He says that he has no appetite and has lost a lot of weight.  He  denies melena or hematochezia.  He is a former smoker and does not drink or do drugs.    His white count was 17.9, hemoglobin 5.1, platelets 68, and creatinine 1.16.  Previously has hemoglobin was 7.5.  His INR was 1.4.  Hence EKG showed A. fib with right bundle branch block and left anterior fascicular block.  Chest x-ray showed bronchiectasis.  Per the ER his stool was guaiac negative.    Review of Systems:  Constitutional:  fatigue  Skin: No rash  Eyes: No  eye pain    ENT: No ear pain  Endocrine: No excessive thirst    Cardiovascular: No blue hands  Respiratory: No hemoptysis   Gastrointestinal: No delgado colored stools  Genitourinary: No blood in urine  Musculoskeletal: No nodules joints   Neurologic: No double vision  Hematologic: No unusual bruising or bleeding  Psychiatric: No hallucinations    Past medical history:   Age related macular degeneration  Amputated below knee  Anemia  Atrial fibrillation  BPH - Benign prostatic hypertrophy  Blind left eye  Blindness - both eyes  CHF (congestive heart failure)  CHF - Congestive heart failure  H/O: blood transfusion  HTN (hypertension)  Hearing problem  Hearing problem  Hypertension  Kidney stone  PVD (peripheral vascular disease)  Risk factors for  obstructive sleep apnea  Visual impairment    Past surgical history:   bka    Family history:  mother no diabetes  _    Social history:  _   Alcohol  Details: Alcohol Abuse in Household: No.  Use: None.  Exercise  Details: Exercise: Never.  Sexual  Details: Sexual orientation: Straight or heterosexual.  Substance Abuse  Details: Use: None.  Tobacco  Details: Smoker in [place]: No.  Smoked/Smokeless Tobacco Last 30 Days: No.  Smoking Tobacco Use: Never smoker.  Smokeless Tobacco Use Never.  Cultural/Quaker Practices  Details: Quaker or Cultural Practices While in Hospital: No.     Home Medications (11) Active  B-Plex (Vitamin B Complex) oral tablet 1 tab, Oral, Daily  cyanocobalamin (vitamin B12) oral 1,000 mcg tablet 1,000 mcg = 1 tab, Oral, Daily  Eliquis oral 5 mg tablet 5 mg = 1 tab, Oral, BID  FeroSul 325 mg (65 mg elemental iron) oral tablet 325 mg = 1 tab, Oral, TID  finasteride oral 5 mg tablet 5 mg = 1 tab, Oral, Daily  folic acid (vitamin B9) oral 1 mg tablet 1 mg = 1 tab, Oral, Daily  furosemide oral 20 mg tablet 20 mg = 1 tab, Oral, Daily  oxybutynin extended release 10 mg, Oral, Daily  polyethylene glycol 3350 oral powder for solution 17 gm = 17 gm, PRN, Oral, Daily  QUEtiapine oral 25 mg tablet 25 mg = 1 tab, Oral, Daily  tamsulosin oral 0.4 mg capsule 0.4 mg = 1 cap, Oral, Daily     Allergies (2) Active Reaction  NKA, None Documented  No Known Medication Allergies None Documented      Immunizations:  Pneumovax _  Flu shot _        Vitals between:   14-FEB-2019 18:13:18   TO   15-FEB-2019 18:13:18                   LAST RESULT MINIMUM MAXIMUM  Temperature 36.9 36.9 36.9  Heart Rate 99 99 99  Respiratory Rate 20 20 20  NISBP           130 130 130  NIDBP           46 46 46  NIMBP           74 74 74  SpO2                    96 96 96    Physical exam:  General: Very ill-appearing male  Eyes : icterus  ENT: normal mucous membranes    pinna normal     Neck: Throat midline. No lymphadenopathy.No  JVD  Respiratory: Lungs clear . No wheezes.  Cardio: Normal S1/S2.  No murmurs, clicks, gallops, rubs.  2 plus leg edema  Gastrointestinal: Soft, NT, ND. +BS   Muskuloskeletal: No gouty deposits  Neuro: Moves all extremities.  Skin: Multiple hemorrhages on his skin  psychiatric : mood appropriate  sepsis focused exam perfusion normal    Vital Signs    Vitals between:   14-FEB-2019 18:13:18   TO   15-FEB-2019 18:13:18                   LAST RESULT MINIMUM MAXIMUM  Temperature 36.9 36.9 36.9  Heart Rate 99 99 99  Respiratory Rate 20 20 20  NISBP           130 130 130  NIDBP           46 46 46  NIMBP           74 74 74  SpO2                    96 96 96       Cardiopulmonary  cardiovascular    Labs:     Labs between:  14-FEB-2019 18:13 to 15-FEB-2019 18:13    CBC:                 WBC  HgB  Hct  Plt  MCV  RDW   15-FEB-2019 (H) 17.9  (!) 5.1  (L) 16.8  (L) 68  (H) 118.3  (H) 18.4     DIFF:                 Seg  Neutroph//ABS  Lymph//ABS  Mono//ABS  EOS/ABS  15-FEB-2019 NOT APPLICABLE  90 // (H) 16.0  4 // (L) 0.8  4 // 0.7 0 // (L) 0.0     BMP:                 Na  Cl  BUN  Glu   15-FEB-2019 142  107  (H) 28  (H) 147                              K  CO2  Cr  Ca                              3.6  27  1.16  8.7     COAG:                 INR  PT  PTT  Ddimer  Fibrinogen    15-FEB-2019 1.4  (H) 14.7  28                        Cardiovascular Labs   No cardiovascular lab results found over the previous 48 hours.  Respiratory Labs    No Resp results found over the defined time period  Neurological Labs   No neurological lab results were found over the previous 24 hours.     Radiology:       Result title:  XR CHEST 1V  Result status:  Final  Verified by:  ANNA OLIVARES on 02/15/2019 1:49  FINDINGS:The Cardiomediastinal silhouette is of normal size and configuration.  There is no evidence of pulmonary vasculature congestion.Chronic areas of bronchiectasis and interstitial thickening noted on the medial lung bases, unchanged  compared to the prior study.  No consolidation, effusions or pneumothorax identified.The regional bones are intact  EXAM: XR CHEST 1VCLINICAL INDICATION: Leukocytosis. Anemia.COMPARISON: Chest x-ray December 27, 2018.FINDINGS:The Cardiomediastinal silhouette is of normal size and configuration.  There is no evidence of pulmonary vasculature congestion.Chronic areas of bronchiectasis and interstitial thickening noted on the medial lung bases, unchanged compared to the prior study.  No consolidation, effusions or pneumothorax identified.The regional bones are  intact.IMPRESSION1.  Chronic areas of bronchiectasis and interstitial thickening noted on the medial lung bases, unchanged compared to the prior study.2.  No radiographic evidence of an acute pulmonary process.  Result title:  XR CHEST 1V  Result status:  Final  Verified by:  ANNA OLIVARES on 02/15/2019 1:49  EXAM: XR CHEST 1VCLINICAL INDICATION: Leukocytosis. Anemia.COMPARISON: Chest x-ray December 27, 2018.FINDINGS:The Cardiomediastinal silhouette is of normal size and configuration.  There is no evidence of pulmonary vasculature congestion.Chronic areas of bronchiectasis and interstitial thickening noted on the medial lung bases, unchanged compared to the prior study.  No consolidation, effusions or pneumothorax identified.The regional bones are  intact.IMPRESSION1.  Chronic areas of bronchiectasis and interstitial thickening noted on the medial lung bases, unchanged compared to the prior study.2.  No radiographic evidence of an acute pulmonary process.         Assessment/Plan:       _89-year-old retired  with a history of hard of hearing, BKA, peripheral vascular disease,depression, kidney stone, severe tricuspid regurgitation, A. fib on Eliquis, jaundice, BPH admitted with anemia    Severe anemia transfuse 2 units    Leukocytosis possibly related to leukemia consult hematology    chronic  thrombocytopenia continue to  monitor    jaundice consult gi    Atrial fibrillation hold on Eliquis due to anemia    Severe tricuspid regurgitation furosemide BPH finasteride, tamsulosin    CKD 3 stable      Chronic depression Seroquel, Zoloft    BPH tamsulosin, finasteride, oxybutynin    Severe protein calorie malnutrition dietitian      I personally reviewed ecg and agree with interpretation.    The emergency room physician and I discussed different diagnoses and treatment methods.    DVT prophylaxis - none due to anemia    dnr status : full let   Universal Safety Interventions

## 2022-12-21 ENCOUNTER — TRANSCRIPTION ENCOUNTER (OUTPATIENT)
Age: 47
End: 2022-12-21

## 2022-12-21 VITALS
OXYGEN SATURATION: 96 % | RESPIRATION RATE: 17 BRPM | DIASTOLIC BLOOD PRESSURE: 94 MMHG | HEART RATE: 64 BPM | TEMPERATURE: 98 F | SYSTOLIC BLOOD PRESSURE: 146 MMHG

## 2022-12-21 LAB
ANION GAP SERPL CALC-SCNC: 5 MMOL/L — SIGNIFICANT CHANGE UP (ref 5–17)
BUN SERPL-MCNC: 17 MG/DL — SIGNIFICANT CHANGE UP (ref 7–23)
CALCIUM SERPL-MCNC: 9 MG/DL — SIGNIFICANT CHANGE UP (ref 8.5–10.1)
CHLORIDE SERPL-SCNC: 106 MMOL/L — SIGNIFICANT CHANGE UP (ref 96–108)
CO2 SERPL-SCNC: 27 MMOL/L — SIGNIFICANT CHANGE UP (ref 22–31)
CREAT SERPL-MCNC: 0.9 MG/DL — SIGNIFICANT CHANGE UP (ref 0.5–1.3)
CULTURE RESULTS: SIGNIFICANT CHANGE UP
EGFR: 105 ML/MIN/1.73M2 — SIGNIFICANT CHANGE UP
GLUCOSE SERPL-MCNC: 134 MG/DL — HIGH (ref 70–99)
HCT VFR BLD CALC: 37.9 % — LOW (ref 39–50)
HGB BLD-MCNC: 13 G/DL — SIGNIFICANT CHANGE UP (ref 13–17)
MAGNESIUM SERPL-MCNC: 1.9 MG/DL — SIGNIFICANT CHANGE UP (ref 1.6–2.6)
MCHC RBC-ENTMCNC: 27.6 PG — SIGNIFICANT CHANGE UP (ref 27–34)
MCHC RBC-ENTMCNC: 34.3 G/DL — SIGNIFICANT CHANGE UP (ref 32–36)
MCV RBC AUTO: 80.5 FL — SIGNIFICANT CHANGE UP (ref 80–100)
NRBC # BLD: 0 /100 WBCS — SIGNIFICANT CHANGE UP (ref 0–0)
PHOSPHATE SERPL-MCNC: 2 MG/DL — LOW (ref 2.5–4.5)
PLATELET # BLD AUTO: 258 K/UL — SIGNIFICANT CHANGE UP (ref 150–400)
POTASSIUM SERPL-MCNC: 3.2 MMOL/L — LOW (ref 3.5–5.3)
POTASSIUM SERPL-SCNC: 3.2 MMOL/L — LOW (ref 3.5–5.3)
RBC # BLD: 4.71 M/UL — SIGNIFICANT CHANGE UP (ref 4.2–5.8)
RBC # FLD: 13.2 % — SIGNIFICANT CHANGE UP (ref 10.3–14.5)
SODIUM SERPL-SCNC: 138 MMOL/L — SIGNIFICANT CHANGE UP (ref 135–145)
SPECIMEN SOURCE: SIGNIFICANT CHANGE UP
WBC # BLD: 7.98 K/UL — SIGNIFICANT CHANGE UP (ref 3.8–10.5)
WBC # FLD AUTO: 7.98 K/UL — SIGNIFICANT CHANGE UP (ref 3.8–10.5)

## 2022-12-21 RX ORDER — OXYCODONE HYDROCHLORIDE 5 MG/1
1 TABLET ORAL
Qty: 12 | Refills: 0
Start: 2022-12-21

## 2022-12-21 RX ORDER — SODIUM,POTASSIUM PHOSPHATES 278-250MG
1 POWDER IN PACKET (EA) ORAL ONCE
Refills: 0 | Status: COMPLETED | OUTPATIENT
Start: 2022-12-21 | End: 2022-12-21

## 2022-12-21 RX ORDER — FLUTICASONE PROPIONATE 50 MCG
2 SPRAY, SUSPENSION NASAL
Qty: 0 | Refills: 0 | DISCHARGE

## 2022-12-21 RX ORDER — POTASSIUM PHOSPHATE, MONOBASIC POTASSIUM PHOSPHATE, DIBASIC 236; 224 MG/ML; MG/ML
30 INJECTION, SOLUTION INTRAVENOUS ONCE
Refills: 0 | Status: COMPLETED | OUTPATIENT
Start: 2022-12-21 | End: 2022-12-21

## 2022-12-21 RX ORDER — POTASSIUM CHLORIDE 20 MEQ
40 PACKET (EA) ORAL EVERY 4 HOURS
Refills: 0 | Status: COMPLETED | OUTPATIENT
Start: 2022-12-21 | End: 2022-12-21

## 2022-12-21 RX ADMIN — Medication 40 MILLIEQUIVALENT(S): at 09:21

## 2022-12-21 RX ADMIN — Medication 40 MILLIEQUIVALENT(S): at 14:12

## 2022-12-21 RX ADMIN — SODIUM CHLORIDE 75 MILLILITER(S): 9 INJECTION, SOLUTION INTRAVENOUS at 06:01

## 2022-12-21 RX ADMIN — POTASSIUM PHOSPHATE, MONOBASIC POTASSIUM PHOSPHATE, DIBASIC 83.33 MILLIMOLE(S): 236; 224 INJECTION, SOLUTION INTRAVENOUS at 09:59

## 2022-12-21 RX ADMIN — Medication 100 MILLIGRAM(S): at 05:56

## 2022-12-21 RX ADMIN — HEPARIN SODIUM 5000 UNIT(S): 5000 INJECTION INTRAVENOUS; SUBCUTANEOUS at 05:55

## 2022-12-21 RX ADMIN — Medication 975 MILLIGRAM(S): at 05:57

## 2022-12-21 RX ADMIN — Medication 975 MILLIGRAM(S): at 06:57

## 2022-12-21 RX ADMIN — Medication 200 MILLIGRAM(S): at 05:56

## 2022-12-21 RX ADMIN — Medication 975 MILLIGRAM(S): at 00:27

## 2022-12-21 NOTE — DISCHARGE NOTE NURSING/CASE MANAGEMENT/SOCIAL WORK - NSDCVIVACCINE_GEN_ALL_CORE_FT
Tdap; 21-Dec-2018 12:42; Chelsey Hoover (SHANITA); [employer]; [id number] (Exp. Date: 03-Dec-2020); IntraMuscular; Deltoid Right.; 0.5 milliLiter(s); VIS (VIS Published: 09-May-2013, VIS Presented: 21-Dec-2018);

## 2022-12-21 NOTE — DISCHARGE NOTE PROVIDER - HOSPITAL COURSE
46 y/o male admitted with acute cholecystitis s/p laparoscopic cholecystectomy 12/20/22. At the time of discharge, the patient was hemodynamically stable, was tolerating PO diet, was voiding urine, was ambulating, and was comfortable with adequate pain control.

## 2022-12-21 NOTE — DISCHARGE NOTE PROVIDER - NSDCMRMEDTOKEN_GEN_ALL_CORE_FT
amLODIPine 10 mg oral tablet: 1 tab(s) orally once a day  benzonatate 100 mg oral capsule: 1 cap(s) orally 3 times a day filled on 12/15/22  fluticasone 50 mcg/inh nasal spray: 2 spray(s) nasal once a day  gemfibrozil 600 mg oral tablet: 1 tab(s) orally 2 times a day  lisinopril 10 mg oral tablet: 1 tab(s) orally once a day  Tamiflu 75 mg oral capsule: 1 cap(s) orally 2 times a day filled on 12/15/22   amLODIPine 10 mg oral tablet: 1 tab(s) orally once a day  gemfibrozil 600 mg oral tablet: 1 tab(s) orally 2 times a day  lisinopril 10 mg oral tablet: 1 tab(s) orally once a day  oxyCODONE 5 mg oral tablet: 1 tab(s) orally every 4 hours, As Needed -for severe pain MDD:6 tabs

## 2022-12-21 NOTE — DISCHARGE NOTE PROVIDER - NSDCFUADDAPPT_GEN_ALL_CORE_FT
Follow up in 7-10 days with Dr. Teague. Please call the office to make an appointment.     Please follow up with your primary care physician regarding your hospitalization. Please schedule an appointment with your primary care provider within two weeks after your discharge to review your hospital course.

## 2022-12-21 NOTE — DISCHARGE NOTE PROVIDER - NSDCCPCAREPLAN_GEN_ALL_CORE_FT
PRINCIPAL DISCHARGE DIAGNOSIS  Diagnosis: Acute cholecystitis  Assessment and Plan of Treatment:       SECONDARY DISCHARGE DIAGNOSES  Diagnosis: Influenza  Assessment and Plan of Treatment:

## 2022-12-21 NOTE — DISCHARGE NOTE NURSING/CASE MANAGEMENT/SOCIAL WORK - PATIENT PORTAL LINK FT
You can access the FollowMyHealth Patient Portal offered by [facility] by registering at the following website: [email or web address]. By joining [facility]’s FollowMyHealth portal, you will also be able to view your health information using other applications (apps) compatible with our system.

## 2022-12-21 NOTE — DISCHARGE NOTE PROVIDER - CARE PROVIDER_API CALL
Michael Teague)  Surgery  [address], [address]  [place] [id number]  Phone: [phone]  Fax: [phone]  Follow Up Time:

## 2022-12-21 NOTE — CHART NOTE - NSCHARTNOTEFT_GEN_A_CORE
To Whom it May Concern,    Mr. Robby Ayala was admitted to our facility from December 20-21, 2022.   He may return to work when cleared by his surgeon.    Please call with any questions.    Thank you,  Tania Beavers PA-c  Department of Surgery  [facility]  [phone]

## 2022-12-21 NOTE — DISCHARGE NOTE PROVIDER - NSDCFUADDINST_GEN_ALL_CORE_FT
Activity as tolerated. Rest as needed. Do not lift anything heavier than 10 pounds. You may take motrin or advil for mild pain as needed, in addition to prescribed narcotic medication. Do not drive while taking narcotic pain medication. You may take over the counter stool softeners as needed. Call for any fever over 101, nausea, vomiting, severe pain, no passing of gas or bowel movement. You may shower and pat dry abdomen. Leave the white steri strips in place; they will fall off in 5-7 days.  Activity as tolerated. Rest as needed. Do not lift anything heavier than 10 pounds. You may take tylenol for mild pain as needed, in addition to prescribed narcotic medication. Do not drive while taking narcotic pain medication. You may take over the counter stool softeners as needed. Call for any fever over 101, nausea, vomiting, severe pain, no passing of gas or bowel movement. You may shower and pat dry abdomen. Leave the white steri strips in place; they will fall off in 5-7 days.

## 2022-12-21 NOTE — PROGRESS NOTE ADULT - SUBJECTIVE AND OBJECTIVE BOX
SURGERY PROGRESS HPI:  POD#1  Pt seen and examined at bedside. Feels well. Pain is well controlled. Pt denies complaints. Pt tolerating regular diet. Pt denies nausea and vomiting. Voiding well. Pt denies chest pain, SOB, dizziness, fever, chills. Ambulating.    Vital Signs Last 24 Hrs  T(C): 36.4 (21 Dec 2022 03:30), Max: 37 (20 Dec 2022 17:57)  T(F): 97.6 (21 Dec 2022 03:30), Max: 98.6 (20 Dec 2022 17:57)  HR: 56 (21 Dec 2022 03:30) (56 - 83)  BP: 135/81 (21 Dec 2022 03:30) (114/72 - 148/91)  BP(mean): --  RR: 18 (21 Dec 2022 03:30) (14 - 20)  SpO2: 97% (21 Dec 2022 03:30) (95% - 99%)    Parameters below as of 21 Dec 2022 03:30  Patient On (Oxygen Delivery Method): room air        PHYSICAL EXAM:    CONSTITUTIONAL: NAD  HEENT:  Atraumatic, Normocephalic  RESPIRATORY: Clear to ausculation, bilaterally   CARDIOVASCULAR: RRR S1S2  GASTROINTESTINAL: Dressings clean/dry/intact. non distended, +BS, soft, appropriate incisional tenderness  MUSCULOSKELETAL: calf soft, non tender b/l    I&O's Detail    20 Dec 2022 07:01  -  21 Dec 2022 05:44  --------------------------------------------------------  IN:  Total IN: 0 mL    OUT:    Voided (mL): 300 mL  Total OUT: 300 mL    Total NET: -300 mL      LABS:                        13.2   6.28  )-----------( 220      ( 20 Dec 2022 03:58 )             39.5     12-20    139  |  110<H>  |  17  ----------------------------<  137<H>  3.4<L>   |  23  |  0.77    Ca    8.9      20 Dec 2022 03:58    TPro  7.8  /  Alb  3.7  /  TBili  0.4  /  DBili  x   /  AST  14<L>  /  ALT  25  /  AlkPhos  106  12-20    PT/INR - ( 20 Dec 2022 12:23 )   PT: 13.6 sec;   INR: 1.14 ratio      PTT - ( 20 Dec 2022 12:23 )  PTT:33.1 sec    Urinalysis Basic - ( 20 Dec 2022 06:00 )  Color: Yellow / Appearance: Clear / S.010 / pH: x  Gluc: x / Ketone: Trace  / Bili: Negative / Urobili: Negative mg/dL   Blood: x / Protein: 30 mg/dL / Nitrite: Negative   Leuk Esterase: Negative / RBC: Negative /HPF / WBC Negative   Sq Epi: x / Non Sq Epi: x / Bacteria: x        Assessment: 47M s/p laparoscopic cholecystectomy POD#1    Plan:  -continue diet  -pain management prn  -continue DVT prophylaxis, OOB, Ambulating  -continue incentive spirometer  -continue local wound care  -f/u labs  -will discuss pt with surgical attending

## 2022-12-21 NOTE — DISCHARGE NOTE NURSING/CASE MANAGEMENT/SOCIAL WORK - NSDCPEFALRISK_GEN_ALL_CORE
For information on Fall & Injury Prevention, visit: [email or web address] OR  [email or web address] OR  https://www.cdc.gov/steadi/patient.html

## 2022-12-22 LAB — SURGICAL PATHOLOGY STUDY: SIGNIFICANT CHANGE UP

## 2022-12-23 ENCOUNTER — TRANSCRIPTION ENCOUNTER (OUTPATIENT)
Age: 47
End: 2022-12-23

## 2022-12-27 ENCOUNTER — APPOINTMENT (OUTPATIENT)
Dept: SURGERY | Facility: CLINIC | Age: 47
End: 2022-12-27

## 2022-12-27 VITALS
HEIGHT: 69 IN | OXYGEN SATURATION: 98 % | SYSTOLIC BLOOD PRESSURE: 137 MMHG | BODY MASS INDEX: 30.36 KG/M2 | WEIGHT: 205 LBS | TEMPERATURE: 97.9 F | DIASTOLIC BLOOD PRESSURE: 91 MMHG | HEART RATE: 68 BPM

## 2022-12-27 PROCEDURE — 99024 POSTOP FOLLOW-UP VISIT: CPT

## 2022-12-28 NOTE — PHYSICAL EXAM
[No Rash or Lesion] : No rash or lesion [Alert] : alert [Oriented to Person] : oriented to person [Oriented to Place] : oriented to place [Calm] : calm [de-identified] : comfortable, well appearing [de-identified] : no scleral icterus [de-identified] : breathing comfortably on room air, no cough [de-identified] : soft, not tender and not distended\par No rebound, no guarding\par Some resolving ecchymosis by the subxiphoid incision site\par No surrounding erythema, no drainage.

## 2022-12-28 NOTE — PLAN
[FreeTextEntry1] : 47 year old man s/p laparoscopic cholecystectomy for acute cholecystitis, POD #7, recovering well\par - pathology: cholelithiasis, acute and chronic cholecystitis discussed with the patient\par - okay to return to work light duty, no heavy lifting, no responding to physical altercations starting 1/2/2023\par - follow up on 1/17/2023\par

## 2022-12-28 NOTE — HISTORY OF PRESENT ILLNESS
[de-identified] : 47 year old man with a history of Crohn's s/p partial colectomy, presented to [facility] with abdominal pain and was diagnosed with acute cholecystitis.\par He underwent a laparoscopic cholecystectomy on 12/20/2022.  [de-identified] : He presents to the office for a follow up visit.\par He denies any nausea, vomiting, fever or chills. He is tolerating PO intake with normal GI function.\par He states on POD #3, he was lifting some heavy boxes for his family, and felt some pulling. He states he needed to take an oxycodone two nights ago to get comfortable for sleep.

## 2022-12-29 DIAGNOSIS — J45.909 UNSPECIFIED ASTHMA, UNCOMPLICATED: ICD-10-CM

## 2022-12-29 DIAGNOSIS — Z79.52 LONG TERM (CURRENT) USE OF SYSTEMIC STEROIDS: ICD-10-CM

## 2022-12-29 DIAGNOSIS — J10.1 INFLUENZA DUE TO OTHER IDENTIFIED INFLUENZA VIRUS WITH OTHER RESPIRATORY MANIFESTATIONS: ICD-10-CM

## 2022-12-29 DIAGNOSIS — K50.90 CROHN'S DISEASE, UNSPECIFIED, WITHOUT COMPLICATIONS: ICD-10-CM

## 2022-12-29 DIAGNOSIS — Z88.0 ALLERGY STATUS TO PENICILLIN: ICD-10-CM

## 2022-12-29 DIAGNOSIS — K81.0 ACUTE CHOLECYSTITIS: ICD-10-CM

## 2022-12-29 DIAGNOSIS — R10.9 UNSPECIFIED ABDOMINAL PAIN: ICD-10-CM

## 2022-12-29 DIAGNOSIS — Z88.8 ALLERGY STATUS TO OTHER DRUGS, MEDICAMENTS AND BIOLOGICAL SUBSTANCES STATUS: ICD-10-CM

## 2022-12-29 DIAGNOSIS — Z88.6 ALLERGY STATUS TO ANALGESIC AGENT: ICD-10-CM

## 2023-01-17 ENCOUNTER — APPOINTMENT (OUTPATIENT)
Dept: SURGERY | Facility: CLINIC | Age: 48
End: 2023-01-17
Payer: COMMERCIAL

## 2023-01-17 VITALS
WEIGHT: 205 LBS | DIASTOLIC BLOOD PRESSURE: 81 MMHG | HEIGHT: 69 IN | BODY MASS INDEX: 30.36 KG/M2 | HEART RATE: 63 BPM | TEMPERATURE: 97.6 F | SYSTOLIC BLOOD PRESSURE: 127 MMHG | OXYGEN SATURATION: 98 %

## 2023-01-17 PROCEDURE — 99024 POSTOP FOLLOW-UP VISIT: CPT

## 2023-01-18 NOTE — HISTORY OF PRESENT ILLNESS
[de-identified] : 47 year old man with a history of Crohn's s/p partial colectomy, presented to [facility] with abdominal pain and was diagnosed with acute cholecystitis.\par He underwent a laparoscopic cholecystectomy on 12/20/2022.  [de-identified] : He presents for a follow up visit.\par He denies any nausea, vomiting, fever or chills.\par No abdominal pain. Not taking any pain medications.\par Ambulating without issues. \par

## 2023-01-18 NOTE — PLAN
[FreeTextEntry1] : 47 year old man recovered well after laparoscopic cholecystectomy\par - follow up as needed\par - no restrictions for return to work; letter written and given to patient\par

## 2023-01-30 NOTE — PATIENT PROFILE ADULT - COMPLETE THE FOLLOWING IF THE PATIENT REFUSES THE INFLUENZA VACCINE:
Aishwarya,    Lyme disease test is not showing any abnormalities.     Please do not hesitate to call us at [phone] if you have any questions or concerns.    Thank you,    Vandana Hernandez MD MPH    Risks/benefits discussed with patient/surrogate

## 2023-07-16 NOTE — ED PROCEDURE NOTE - NS ED PROC PERFORMED BY1 FT
Problem: MOBILITY - ADULT  Goal: Maintain or return to baseline ADL function  Description: INTERVENTIONS:  -  Assess patient's ability to carry out ADLs; assess patient's baseline for ADL function and identify physical deficits which impact ability to perform ADLs (bathing, care of mouth/teeth, toileting, grooming, dressing, etc.)  - Assess/evaluate cause of self-care deficits   - Assess range of motion  - Assess patient's mobility; develop plan if impaired  - Assess patient's need for assistive devices and provide as appropriate  - Encourage maximum independence but intervene and supervise when necessary  - Involve family in performance of ADLs  - Assess for home care needs following discharge   - Consider OT consult to assist with ADL evaluation and planning for discharge  - Provide patient education as appropriate  Outcome: Progressing  Goal: Maintains/Returns to pre admission functional level  Description: INTERVENTIONS:  - Perform BMAT or MOVE assessment daily.   - Set and communicate daily mobility goal to care team and patient/family/caregiver. - Collaborate with rehabilitation services on mobility goals if consulted  - Perform Range of Motion  times a day. - Reposition patient every  hours.   - Dangle patient  times a day  - Stand patient  times a day  - Ambulate patient  times a day  - Out of bed to chair  times a day   - Out of bed for meals  times a day  - Out of bed for toileting  - Record patient progress and toleration of activity level   Outcome: Progressing     Problem: PAIN - ADULT  Goal: Verbalizes/displays adequate comfort level or baseline comfort level  Description: Interventions:  - Encourage patient to monitor pain and request assistance  - Assess pain using appropriate pain scale  - Administer analgesics based on type and severity of pain and evaluate response  - Implement non-pharmacological measures as appropriate and evaluate response  - Consider cultural and social influences on pain
and pain management  - Notify physician/advanced practitioner if interventions unsuccessful or patient reports new pain  Outcome: Progressing     Problem: INFECTION - ADULT  Goal: Absence or prevention of progression during hospitalization  Description: INTERVENTIONS:  - Assess and monitor for signs and symptoms of infection  - Monitor lab/diagnostic results  - Monitor all insertion sites, i.e. indwelling lines, tubes, and drains  - Monitor endotracheal if appropriate and nasal secretions for changes in amount and color  - [place] appropriate cooling/warming therapies per order  - Administer medications as ordered  - Instruct and encourage patient and family to use good hand hygiene technique  - Identify and instruct in appropriate isolation precautions for identified infection/condition  Outcome: Progressing  Goal: Absence of fever/infection during neutropenic period  Description: INTERVENTIONS:  - Monitor WBC    Outcome: Progressing     Problem: SAFETY ADULT  Goal: Maintain or return to baseline ADL function  Description: INTERVENTIONS:  -  Assess patient's ability to carry out ADLs; assess patient's baseline for ADL function and identify physical deficits which impact ability to perform ADLs (bathing, care of mouth/teeth, toileting, grooming, dressing, etc.)  - Assess/evaluate cause of self-care deficits   - Assess range of motion  - Assess patient's mobility; develop plan if impaired  - Assess patient's need for assistive devices and provide as appropriate  - Encourage maximum independence but intervene and supervise when necessary  - Involve family in performance of ADLs  - Assess for home care needs following discharge   - Consider OT consult to assist with ADL evaluation and planning for discharge  - Provide patient education as appropriate  Outcome: Progressing  Goal: Maintains/Returns to pre admission functional level  Description: INTERVENTIONS:  - Perform BMAT or MOVE assessment daily.   - Set and
communicate daily mobility goal to care team and patient/family/caregiver. - Collaborate with rehabilitation services on mobility goals if consulted  - Perform Range of Motion  times a day. - Reposition patient every hours.   - Dangle patient  times a day  - Stand patient  times a day  - Ambulate patient times a day  - Out of bed to chair times a day   - Out of bed for meals  times a day  - Out of bed for toileting  - Record patient progress and toleration of activity level   Outcome: Progressing  Goal: Patient will remain free of falls  Description: INTERVENTIONS:  - Educate patient/family on patient safety including physical limitations  - Instruct patient to call for assistance with activity   - Consult OT/PT to assist with strengthening/mobility   - Keep Call bell within reach  - Keep bed low and locked with side rails adjusted as appropriate  - Keep care items and personal belongings within reach  - Initiate and maintain comfort rounds  - Make Fall Risk Sign visible to staff  - Offer Toileting every  Hours, in advance of need  - Initiate/Maintain alarm  - Obtain necessary fall risk management equipment:   - Apply yellow socks and bracelet for high fall risk patients  - Consider moving patient to room near nurses station  Outcome: Progressing     Problem: DISCHARGE PLANNING  Goal: Discharge to home or other facility with appropriate resources  Description: INTERVENTIONS:  - Identify barriers to discharge w/patient and caregiver  - Arrange for needed discharge resources and transportation as appropriate  - Identify discharge learning needs (meds, wound care, etc.)  - Arrange for interpretive services to assist at discharge as needed  - Refer to Case Management Department for coordinating discharge planning if the patient needs post-hospital services based on physician/advanced practitioner order or complex needs related to functional status, cognitive ability, or social support system  Outcome: Progressing
Problem: Knowledge Deficit  Goal: Patient/family/caregiver demonstrates understanding of disease process, treatment plan, medications, and discharge instructions  Description: Complete learning assessment and assess knowledge base.   Interventions:  - Provide teaching at level of understanding  - Provide teaching via preferred learning methods  Outcome: Progressing
HAYLEE Daley
tuyet

## 2023-07-26 NOTE — ED ADULT NURSE NOTE - NSICDXPASTSURGICALHX_GEN_ALL_CORE_FT
Requested Prescriptions     Pending Prescriptions Disp Refills    ALPRAZolam (XANAX) 0.5 MG tablet [Pharmacy Med Name: ALPRAZOLAM 0.5 MG TABLET] 60 tablet      Sig: TAKE ONE TABLET BY MOUTH TWICE A DAY          Last Office Visit: 3/8/2023     Next Office Visit: Visit date not found     Last Labs:  6/22/22 PAST SURGICAL HISTORY:  History of colon resection

## 2024-03-26 ENCOUNTER — NON-APPOINTMENT (OUTPATIENT)
Age: 49
End: 2024-03-26

## 2024-03-26 DIAGNOSIS — J30.89 OTHER ALLERGIC RHINITIS: ICD-10-CM

## 2024-03-26 DIAGNOSIS — Z87.19 PERSONAL HISTORY OF OTHER DISEASES OF THE DIGESTIVE SYSTEM: ICD-10-CM

## 2024-03-26 DIAGNOSIS — J01.90 ACUTE SINUSITIS, UNSPECIFIED: ICD-10-CM

## 2024-03-26 DIAGNOSIS — Z02.82 ENCOUNTER FOR ADOPTION SERVICES: ICD-10-CM

## 2024-03-26 DIAGNOSIS — Z78.9 OTHER SPECIFIED HEALTH STATUS: ICD-10-CM

## 2024-03-26 DIAGNOSIS — J30.81 ALLERGIC RHINITIS DUE TO ANIMAL (CAT) (DOG) HAIR AND DANDER: ICD-10-CM

## 2024-03-26 DIAGNOSIS — J30.1 ALLERGIC RHINITIS DUE TO POLLEN: ICD-10-CM

## 2024-03-26 DIAGNOSIS — T78.02XA ANAPHYLACTIC REACTION DUE TO SHELLFISH (CRUSTACEANS), INITIAL ENCOUNTER: ICD-10-CM

## 2024-03-26 DIAGNOSIS — J45.40 MODERATE PERSISTENT ASTHMA, UNCOMPLICATED: ICD-10-CM

## 2024-03-26 RX ORDER — ALBUTEROL SULFATE 2.5 MG/3ML
(2.5 MG/3ML) SOLUTION RESPIRATORY (INHALATION) 4 TIMES DAILY
Refills: 0 | Status: ACTIVE | COMMUNITY

## 2024-03-26 RX ORDER — EPINEPHRINE 0.3 MG/.3ML
0.3 INJECTION INTRAMUSCULAR
Refills: 0 | Status: ACTIVE | COMMUNITY

## 2024-03-26 RX ORDER — ALBUTEROL SULFATE 90 UG/1
108 (90 BASE) INHALANT RESPIRATORY (INHALATION)
Refills: 0 | Status: ACTIVE | COMMUNITY

## 2024-03-26 RX ORDER — FLUTICASONE FUROATE, UMECLIDINIUM BROMIDE AND VILANTEROL TRIFENATATE 200; 62.5; 25 UG/1; UG/1; UG/1
200-62.5-25 POWDER RESPIRATORY (INHALATION)
Refills: 0 | Status: ACTIVE | COMMUNITY

## 2024-04-04 ENCOUNTER — APPOINTMENT (OUTPATIENT)
Dept: PEDIATRIC ALLERGY IMMUNOLOGY | Facility: CLINIC | Age: 49
End: 2024-04-04
Payer: COMMERCIAL

## 2024-04-04 DIAGNOSIS — J45.30 MILD PERSISTENT ASTHMA, UNCOMPLICATED: ICD-10-CM

## 2024-04-04 DIAGNOSIS — J45.990 EXERCISE INDUCED BRONCHOSPASM: ICD-10-CM

## 2024-04-04 DIAGNOSIS — Z91.013 ALLERGY TO SEAFOOD: ICD-10-CM

## 2024-04-04 PROCEDURE — 94010 BREATHING CAPACITY TEST: CPT

## 2024-04-04 PROCEDURE — 95012 NITRIC OXIDE EXP GAS DETER: CPT

## 2024-04-04 PROCEDURE — 99213 OFFICE O/P EST LOW 20 MIN: CPT | Mod: 25

## 2024-04-04 NOTE — REVIEW OF SYSTEMS
[Difficulty Breathing] : dyspnea [SOB with Exertion] : dyspnea on exertion [Wheezing Worsens With Exercise] : wheezing worsens with exercise [FreeTextEntry6] : He has had several episodes of wheezing with exercise.

## 2024-04-04 NOTE — IMPRESSION
[Normal PFT] : pulmonary function test normal  [FreeTextEntry1] : FEV1 is 91% of predicted. His FENO is 23 PPB

## 2024-04-04 NOTE — HISTORY OF PRESENT ILLNESS
[de-identified] : Robby comes today for follow-up of his asthma.  He does have seasonal allergic rhinitis as well.  He had a sudden asthma episode that occurred approximately 4 days ago.  He did use an albuterol inhaler and it helped an episode.  Last weekend he was jogging and around 2 mile [place] he started having tightness in his chest and wheezing.  He normally uses his albuterol inhaler prior to exercise which he did and he had the inhaler with him to use that again. He is not having any nasal allergic rhinitis symptoms.  Medications include Trelegy 201 elation daily. His EpiPen is up-to-date.  He also needs albuterol metered-dose inhaler and a box of nebulized albuterol vials. [0 x/month] : 0 x/month [Minor Limitation] : minor limitation [< or = 2 days/wk] : < than or = 2 days/week

## 2024-04-08 RX ORDER — FLUTICASONE FUROATE, UMECLIDINIUM BROMIDE AND VILANTEROL TRIFENATATE 200; 62.5; 25 UG/1; UG/1; UG/1
200-62.5-25 POWDER RESPIRATORY (INHALATION)
Qty: 3 | Refills: 0 | Status: ACTIVE | COMMUNITY
Start: 2024-04-08 | End: 1900-01-01

## 2024-04-08 RX ORDER — ALBUTEROL SULFATE 90 UG/1
108 (90 BASE) INHALANT RESPIRATORY (INHALATION) EVERY 4 HOURS
Qty: 1 | Refills: 1 | Status: ACTIVE | COMMUNITY
Start: 2024-04-08 | End: 1900-01-01

## 2024-08-06 ENCOUNTER — APPOINTMENT (OUTPATIENT)
Dept: PEDIATRIC ALLERGY IMMUNOLOGY | Facility: CLINIC | Age: 49
End: 2024-08-06

## 2024-08-06 PROBLEM — Z87.19 HISTORY OF CROHN'S DISEASE: Status: RESOLVED | Noted: 2024-03-26 | Resolved: 2024-08-06

## 2024-08-06 PROBLEM — R05.9 COUGH IN ADULT: Status: ACTIVE | Noted: 2024-08-06

## 2024-08-06 PROCEDURE — 94010 BREATHING CAPACITY TEST: CPT

## 2024-08-06 PROCEDURE — 99214 OFFICE O/P EST MOD 30 MIN: CPT | Mod: 25

## 2024-08-06 NOTE — REASON FOR VISIT
[Sick Visit] : a sick visit [Congestion] : congestion [Asthma] : asthma [Wheezing] : wheezing [Cough] : cough

## 2024-08-06 NOTE — REVIEW OF SYSTEMS
[Rhinorrhea] : rhinorrhea [Nasal Congestion] : nasal congestion [Post Nasal Drip] : post nasal drip [SOB with Exertion] : dyspnea on exertion [Nocturnal Awakening] : nocturnal awakening with shortness of breath [Cough] : cough [Congested In The Chest] : feeling ~L congested in the chest [Wheezing] : wheezing [Nl] : Genitourinary

## 2024-08-06 NOTE — PHYSICAL EXAM
[Alert] : alert [Well Nourished] : well nourished [Healthy Appearance] : healthy appearance [No Acute Distress] : no acute distress [Well Developed] : well developed [Normal Pupil & Iris Size/Symmetry] : normal pupil and iris size and symmetry [No Discharge] : no discharge [No Photophobia] : no photophobia [Sclera Not Icteric] : sclera not icteric [Normal TMs] : both tympanic membranes were normal [Normal Nasal Mucosa] : the nasal mucosa was normal [Normal Lips/Tongue] : the lips and tongue were normal [Normal Outer Ear/Nose] : the ears and nose were normal in appearance [Normal Tonsils] : normal tonsils [No Thrush] : no thrush [Pale mucosa] : pale mucosa [Supple] : the neck was supple [Normal Rate and Effort] : normal respiratory rhythm and effort [No Crackles] : no crackles [No Retractions] : no retractions [Bilateral Audible Breath Sounds] : bilateral audible breath sounds [Wheezing] : wheezing was heard [Normal Rate] : heart rate was normal  [Normal S1, S2] : normal S1 and S2 [No murmur] : no murmur [Regular Rhythm] : with a regular rhythm [Soft] : abdomen soft [Not Tender] : non-tender [Not Distended] : not distended [No HSM] : no hepato-splenomegaly [Normal Cervical Lymph Nodes] : cervical [Skin Intact] : skin intact  [No Rash] : no rash [No Skin Lesions] : no skin lesions [No clubbing] : no clubbing [No Edema] : no edema [No Cyanosis] : no cyanosis [Normal Mood] : mood was normal [Normal Affect] : affect was normal [Alert, Awake, Oriented as Age-Appropriate] : alert, awake, oriented as age appropriate [de-identified] : PND. Cloudy nasal mucous

## 2024-08-06 NOTE — ASSESSMENT
[FreeTextEntry1] : Robby Ayala has evidence of sinusitis exacerbating his asthma.  He has nasal congestion and cough.  This cough is worse at night when he is in a lying down position. 1.  Albuterol as needed # 2.  Clarithromycin 500 mg tabs, 1 tab twice daily for 10 days. 3 albuterol via nebulizer or metered-dose inhaler as needed. 4.  Prednisone 40 mg OD x 5 days.

## 2024-08-06 NOTE — HISTORY OF PRESENT ILLNESS
[Cough] : cough [Wheezing] : wheezing  [> or = 2 days/wk] : > than or = 2 days/wk [> or = 2 x/wk] : > than or = 2 x/week [Minor Limitation] : minor limitation [Daily] : daily [0 - 1/year] : 0 - 1/year [de-identified] : Robby Ayala is a 48-year-old gentleman with a history of asthma.  His asthma has generally been in good control on Trelegy 200, 1 inhalation daily. Over the last 2 weeks he has had increasing cough associated with increasing wheezing that is waking him up at night.  He has also had increasing nasal congestion.  He has been using albuterol for his acute symptoms however it is not helping him.

## 2024-10-02 ENCOUNTER — EMERGENCY (EMERGENCY)
Facility: HOSPITAL | Age: 49
LOS: 0 days | Discharge: HOME | End: 2024-10-02
Attending: EMERGENCY MEDICINE
Payer: COMMERCIAL

## 2024-10-02 VITALS
OXYGEN SATURATION: 97 % | DIASTOLIC BLOOD PRESSURE: 84 MMHG | RESPIRATION RATE: 18 BRPM | HEART RATE: 76 BPM | SYSTOLIC BLOOD PRESSURE: 130 MMHG

## 2024-10-02 VITALS
SYSTOLIC BLOOD PRESSURE: 142 MMHG | RESPIRATION RATE: 18 BRPM | DIASTOLIC BLOOD PRESSURE: 103 MMHG | WEIGHT: 220.02 LBS | TEMPERATURE: 98 F | HEIGHT: 69 IN | HEART RATE: 76 BPM | OXYGEN SATURATION: 99 %

## 2024-10-02 DIAGNOSIS — K50.90 CROHN'S DISEASE, UNSPECIFIED, WITHOUT COMPLICATIONS: ICD-10-CM

## 2024-10-02 DIAGNOSIS — K58.9 IRRITABLE BOWEL SYNDROME, UNSPECIFIED: ICD-10-CM

## 2024-10-02 DIAGNOSIS — Z88.8 ALLERGY STATUS TO OTHER DRUGS, MEDICAMENTS AND BIOLOGICAL SUBSTANCES: ICD-10-CM

## 2024-10-02 DIAGNOSIS — I10 ESSENTIAL (PRIMARY) HYPERTENSION: ICD-10-CM

## 2024-10-02 DIAGNOSIS — Z88.1 ALLERGY STATUS TO OTHER ANTIBIOTIC AGENTS: ICD-10-CM

## 2024-10-02 DIAGNOSIS — M79.89 OTHER SPECIFIED SOFT TISSUE DISORDERS: ICD-10-CM

## 2024-10-02 DIAGNOSIS — Z88.6 ALLERGY STATUS TO ANALGESIC AGENT: ICD-10-CM

## 2024-10-02 DIAGNOSIS — Z88.0 ALLERGY STATUS TO PENICILLIN: ICD-10-CM

## 2024-10-02 DIAGNOSIS — M79.661 PAIN IN RIGHT LOWER LEG: ICD-10-CM

## 2024-10-02 DIAGNOSIS — Z90.49 ACQUIRED ABSENCE OF OTHER SPECIFIED PARTS OF DIGESTIVE TRACT: Chronic | ICD-10-CM

## 2024-10-02 PROCEDURE — 93971 EXTREMITY STUDY: CPT | Mod: 26,RT

## 2024-10-02 PROCEDURE — 99284 EMERGENCY DEPT VISIT MOD MDM: CPT

## 2024-10-02 RX ORDER — ACETAMINOPHEN 325 MG/1
2 TABLET ORAL
Qty: 40 | Refills: 0
Start: 2024-10-02 | End: 2024-10-06

## 2024-10-02 RX ORDER — METHOCARBAMOL 750 MG/1
1 TABLET, FILM COATED ORAL
Qty: 20 | Refills: 0
Start: 2024-10-02 | End: 2024-10-06

## 2024-10-02 NOTE — ED PROVIDER NOTE - PHYSICAL EXAMINATION
Vitals: HTN at 142/103, otherwise WNL  Gen: AAOx3, NAD, sitting comfortably in stretcher  Head: ncat, perrla, eomi b/l  Neck: supple, no lymphadenopathy, no midline deviation  Heart: rrr, no m/r/g  Lungs: CTA b/l, no rales/ronchi/wheezes  Abd: soft, nontender, non-distended, no rebound or guarding  Ext: no clubbing/cyanosis/edema, +TTP at R calf, no skin changes, RLE has normal sensation/cap refill/color/pulses/strength   Neuro: sensation and muscle strength intact b/l

## 2024-10-02 NOTE — ED ADULT NURSE NOTE - OBJECTIVE STATEMENT
pt states that at work today his right lower extremity became swollen and felt cramping. Pt states right calf is more swollen than left. Denies chest pain, SOB, dizziness, cough, weakness. Denies any trauma to the leg. No recent surgery or travel. PMH IBS, Asthma.

## 2024-10-02 NOTE — ED ADULT TRIAGE NOTE - CHIEF COMPLAINT QUOTE
Patient reports "I want to be checked to make sure I don't have a blood clot."  Patient reports Right leg swollen and warm earlier and it went away, "but the warm came back." Denies pain.  PMH: IBS, Crohn's, Asthma

## 2024-10-02 NOTE — ED PROVIDER NOTE - PROGRESS NOTE DETAILS
Results reported to patient--US negative for DVT  Pt. reports feeling better after meds  pt. agrees to f/u with primary care outpt. urgently  pt. understands to return to ED if symptoms worsen; will d/c with tylenol/muscle relaxer for likely muscle spasm

## 2024-10-02 NOTE — ED PROVIDER NOTE - OBJECTIVE STATEMENT
47 yo M with RLE swelling pain, started today for no reason, pain localized to R calf.   Pt. came to ER as he was concerned for DVT, given history of crohn's and IBS.  No other complaints or associated symptoms.   ROS: negative for fever, cough, headache, chest pain, shortness of breath, abd pain, nausea, vomiting, diarrhea, rash, paresthesia, and weakness--all other systems reviewed are negative.   PMH:  crohns dz s/p colon rxn 2014 in [facility] with Dr. Araujo, IBS, cholecystitis s/p laparoscopic cholecystectomy 12/20/22; Meds: See EMR for list; SH: Denies smoking/drinking/drug use

## 2024-10-02 NOTE — ED ADULT NURSE NOTE - NSFALLUNIVINTERV_ED_ALL_ED
Bed/Stretcher in lowest position, wheels locked, appropriate side rails in place/Call bell, personal items and telephone in reach/Instruct patient to call for assistance before getting out of bed/chair/stretcher/Non-slip footwear applied when patient is off stretcher/[place] to call system/Physically safe environment - no spills, clutter or unnecessary equipment/Purposeful proactive rounding/Room/bathroom lighting operational, light cord in reach

## 2024-10-02 NOTE — ED PROVIDER NOTE - PATIENT PORTAL LINK FT
You can access the FollowMyHealth Patient Portal offered by [facility] by registering at the following website: [email or web address]. By joining [facility]’s FollowMyHealth portal, you will also be able to view your health information using other applications (apps) compatible with our system.

## 2024-10-02 NOTE — ED PROVIDER NOTE - CARE PROVIDER_API CALL
Dion Lock  Vascular Surgery  [address]  [place] [id number]  Phone: [phone]  Fax: [phone]  Follow Up Time: Urgent

## 2024-11-25 NOTE — PATIENT PROFILE ADULT. - NSNUTRITIONRISK_GI_A_CORE
Copied from CRM #[id number]. Topic: MW Messaging - MW Patient Request  >> Nov 25, 2024 11:26 AM Natalya JEFF wrote:  Patricia called requesting to send a general message to clinician.   Verified issue is NOT regarding a symptom(s) requiring routine or emergent triage. Verified another message template type and CRM does not apply.    Selected 'Wrap Up CRM' and created new Telephone Encounter after clicking 'Convert to Clinical Call'. Selected appropriate Reason for Call.  Sent Pt message template and routed as routine priority per Clinician KB page to appropriate clinician pool. Readback full message.   No indicators present

## 2024-12-17 ENCOUNTER — NON-APPOINTMENT (OUTPATIENT)
Age: 49
End: 2024-12-17

## 2024-12-17 ENCOUNTER — APPOINTMENT (OUTPATIENT)
Dept: PEDIATRIC ALLERGY IMMUNOLOGY | Facility: CLINIC | Age: 49
End: 2024-12-17
Payer: COMMERCIAL

## 2024-12-17 DIAGNOSIS — Z78.9 OTHER SPECIFIED HEALTH STATUS: ICD-10-CM

## 2024-12-17 DIAGNOSIS — R05.8 OTHER SPECIFIED COUGH: ICD-10-CM

## 2024-12-17 DIAGNOSIS — J01.90 ACUTE SINUSITIS, UNSPECIFIED: ICD-10-CM

## 2024-12-17 DIAGNOSIS — J20.9 ACUTE BRONCHITIS, UNSPECIFIED: ICD-10-CM

## 2024-12-17 PROCEDURE — 99214 OFFICE O/P EST MOD 30 MIN: CPT | Mod: 25

## 2024-12-17 PROCEDURE — 94010 BREATHING CAPACITY TEST: CPT

## 2024-12-17 RX ORDER — PREDNISONE 20 MG/1
20 TABLET ORAL DAILY
Qty: 14 | Refills: 0 | Status: ACTIVE | COMMUNITY
Start: 2024-12-17 | End: 1900-01-01

## 2024-12-17 RX ORDER — ALBUTEROL SULFATE 2.5 MG/3ML
(2.5 MG/3ML) SOLUTION RESPIRATORY (INHALATION) DAILY
Qty: 75 | Refills: 0 | Status: ACTIVE | COMMUNITY
Start: 2024-12-17 | End: 1900-01-01

## 2024-12-17 RX ORDER — CLARITHROMYCIN 500 MG/1
500 TABLET, FILM COATED ORAL TWICE DAILY
Qty: 28 | Refills: 0 | Status: ACTIVE | COMMUNITY
Start: 2024-12-17 | End: 1900-01-01

## 2024-12-17 RX ORDER — ALBUTEROL SULFATE 90 UG/1
108 (90 BASE) INHALANT RESPIRATORY (INHALATION) EVERY 4 HOURS
Qty: 1 | Refills: 0 | Status: ACTIVE | COMMUNITY
Start: 2024-12-17 | End: 1900-01-01

## 2024-12-18 PROBLEM — R05.8 COUGH WITH EXPECTORATION: Status: ACTIVE | Noted: 2024-08-06

## 2024-12-18 PROBLEM — J01.90 SINUSITIS, ACUTE: Status: RESOLVED | Noted: 2024-08-06 | Resolved: 2024-09-05

## 2024-12-18 PROBLEM — J01.90 ACUTE SINUSITIS, UNSPECIFIED: Status: ACTIVE | Noted: 2024-03-26

## 2024-12-31 ENCOUNTER — APPOINTMENT (OUTPATIENT)
Dept: PEDIATRIC ALLERGY IMMUNOLOGY | Facility: CLINIC | Age: 49
End: 2024-12-31

## 2025-02-20 NOTE — ED ADULT NURSE NOTE - PAIN RATING/NUMBER SCALE (0-10): REST
Called and spoke with pt at this time regarding pt requesting to be scheduled. While speaking with pt the phone was disconnected. Pt not successfully scheduled at this time. Will send MyChart at this time.   
6

## 2025-03-25 ENCOUNTER — NON-APPOINTMENT (OUTPATIENT)
Age: 50
End: 2025-03-25

## 2025-03-25 ENCOUNTER — APPOINTMENT (OUTPATIENT)
Dept: PEDIATRIC ALLERGY IMMUNOLOGY | Facility: CLINIC | Age: 50
End: 2025-03-25
Payer: COMMERCIAL

## 2025-03-25 DIAGNOSIS — J45.998 OTHER ASTHMA: ICD-10-CM

## 2025-03-25 PROCEDURE — 94010 BREATHING CAPACITY TEST: CPT

## 2025-03-25 PROCEDURE — 99213 OFFICE O/P EST LOW 20 MIN: CPT | Mod: 25

## 2025-03-25 RX ORDER — ALBUTEROL SULFATE 2.5 MG/3ML
(2.5 MG/3ML) SOLUTION RESPIRATORY (INHALATION) EVERY 4 HOURS
Qty: 25 | Refills: 1 | Status: ACTIVE | COMMUNITY
Start: 2025-03-25 | End: 1900-01-01

## 2025-03-25 RX ORDER — ALBUTEROL SULFATE AND BUDESONIDE 90; 80 UG/1; UG/1
90-80 AEROSOL, METERED RESPIRATORY (INHALATION) EVERY 4 HOURS
Qty: 1 | Refills: 1 | Status: ACTIVE | COMMUNITY
Start: 2025-03-25 | End: 1900-01-01

## 2025-03-25 RX ORDER — PREDNISONE 10 MG/1
10 TABLET ORAL
Qty: 12 | Refills: 0 | Status: ACTIVE | COMMUNITY
Start: 2025-03-25 | End: 1900-01-01

## 2025-03-25 RX ORDER — FLUTICASONE FUROATE, UMECLIDINIUM BROMIDE AND VILANTEROL TRIFENATATE 200; 62.5; 25 UG/1; UG/1; UG/1
200-62.5-25 POWDER RESPIRATORY (INHALATION)
Qty: 60 | Refills: 2 | Status: ACTIVE | COMMUNITY
Start: 2025-03-25 | End: 1900-01-01

## 2025-04-17 NOTE — PROGRESS NOTE ADULT - PROVIDER SPECIALTY LIST ADULT
Faxed records release form from Kristen zavala to [facility] for release of records   Surgery

## 2025-05-30 ENCOUNTER — APPOINTMENT (OUTPATIENT)
Dept: PEDIATRIC ALLERGY IMMUNOLOGY | Facility: CLINIC | Age: 50
End: 2025-05-30
Payer: COMMERCIAL

## 2025-05-30 DIAGNOSIS — J30.89 OTHER ALLERGIC RHINITIS: ICD-10-CM

## 2025-05-30 DIAGNOSIS — J30.81 ALLERGIC RHINITIS DUE TO ANIMAL (CAT) (DOG) HAIR AND DANDER: ICD-10-CM

## 2025-05-30 DIAGNOSIS — J45.40 MODERATE PERSISTENT ASTHMA, UNCOMPLICATED: ICD-10-CM

## 2025-05-30 DIAGNOSIS — J30.1 ALLERGIC RHINITIS DUE TO POLLEN: ICD-10-CM

## 2025-05-30 PROCEDURE — 95004 PERQ TESTS W/ALRGNC XTRCS: CPT

## 2025-05-30 PROCEDURE — 99213 OFFICE O/P EST LOW 20 MIN: CPT | Mod: 25

## 2025-05-30 PROCEDURE — 94010 BREATHING CAPACITY TEST: CPT

## 2025-05-30 RX ORDER — FLUTICASONE FUROATE, UMECLIDINIUM BROMIDE AND VILANTEROL TRIFENATATE 200; 62.5; 25 UG/1; UG/1; UG/1
200-62.5-25 POWDER RESPIRATORY (INHALATION) DAILY
Qty: 1 | Refills: 2 | Status: ACTIVE | COMMUNITY
Start: 2025-05-30 | End: 1900-01-01

## 2025-05-30 RX ORDER — ALBUTEROL SULFATE 2.5 MG/3ML
(2.5 MG/3ML) SOLUTION RESPIRATORY (INHALATION) EVERY 4 HOURS
Qty: 25 | Refills: 1 | Status: ACTIVE | COMMUNITY
Start: 2025-05-30 | End: 1900-01-01

## (undated) DEVICE — ENDOCATCH 10MM SPECIMEN POUCH

## (undated) DEVICE — WARMING BLANKET FULL UNDERBODY

## (undated) DEVICE — DRSG STERISTRIPS 0.5 X 4"

## (undated) DEVICE — TUBING HYDRO-SURG PLUS IRRIGATOR W SMOKEVAC & PROBE

## (undated) DEVICE — SOL IRR POUR H2O 500ML

## (undated) DEVICE — TROCAR COVIDIEN VERSAPORT BLADELESS OPTICAL 5MM STANDARD

## (undated) DEVICE — SUT MONOCRYL 4-0 27" PS-2 UNDYED

## (undated) DEVICE — PACK GENERAL LAPAROSCOPY

## (undated) DEVICE — BASIN SET SINGLE

## (undated) DEVICE — TROCAR APPLIED MEDICAL KII BALLOON BLUNT TIP 12MM X 130MM

## (undated) DEVICE — CANISTER DISPOSABLE THIN WALL 3000CC

## (undated) DEVICE — WARMING BLANKET UPPER ADULT

## (undated) DEVICE — ELCTR REM POLYHESIVE PATIENT RETURN ELECTRODE ADULT

## (undated) DEVICE — ELCTR BOVIE TIP BLADE INSULATED 2.75" EDGE

## (undated) DEVICE — SOL IRR POUR NS 0.9% 1000ML

## (undated) DEVICE — DRAPE TOWEL BLUE 17" X 24"

## (undated) DEVICE — SUT VICRYL 0 27" UR-6

## (undated) DEVICE — TIP METZENBAUM SCISSOR MONOPOLAR ENDOCUT (ORANGE)

## (undated) DEVICE — DISSECTOR ENDOSCOPIC KITTNER SINGLE TIP

## (undated) DEVICE — Device

## (undated) DEVICE — DRAPE 3/4 SHEET 52X76"

## (undated) DEVICE — POSITIONER STRAP ARMBOARD VELCRO TS-30

## (undated) DEVICE — PROTECTOR HEEL / ELBOW

## (undated) DEVICE — TUBING OLYMPUS INSUFFLATION

## (undated) DEVICE — ELCTR GROUNDING PAD ADULT COVIDIEN

## (undated) DEVICE — BLADE SURGICAL #15 CARBON

## (undated) DEVICE — FRA-ESU BOVIE FORCE TRIAD T0E42286E: Type: DURABLE MEDICAL EQUIPMENT

## (undated) DEVICE — TUBING INSUFFLATION LAP FILTER 10FT

## (undated) DEVICE — D HELP - CLEARVIEW CLEARIFY SYSTEM

## (undated) DEVICE — VENODYNE/SCD SLEEVE CALF MEDIUM

## (undated) DEVICE — SYR LUER LOK 20CC